# Patient Record
Sex: MALE | Race: WHITE | Employment: OTHER | URBAN - METROPOLITAN AREA
[De-identification: names, ages, dates, MRNs, and addresses within clinical notes are randomized per-mention and may not be internally consistent; named-entity substitution may affect disease eponyms.]

---

## 2017-02-03 RX ORDER — SIMVASTATIN 40 MG
40 TABLET ORAL NIGHTLY
Qty: 90 TABLET | Refills: 3 | Status: SHIPPED | OUTPATIENT
Start: 2017-02-03

## 2017-09-15 RX ORDER — GLIMEPIRIDE 1 MG/1
TABLET ORAL
Qty: 90 TABLET | Refills: 2 | Status: SHIPPED | OUTPATIENT
Start: 2017-09-15

## 2017-11-27 ENCOUNTER — APPOINTMENT (OUTPATIENT)
Dept: GENERAL RADIOLOGY | Age: 67
DRG: 247 | End: 2017-11-27
Payer: MEDICARE

## 2017-11-27 ENCOUNTER — HOSPITAL ENCOUNTER (INPATIENT)
Age: 67
LOS: 1 days | Discharge: HOME | DRG: 247 | End: 2017-11-28
Attending: EMERGENCY MEDICINE | Admitting: INTERNAL MEDICINE
Payer: MEDICARE

## 2017-11-27 ENCOUNTER — APPOINTMENT (OUTPATIENT)
Dept: CARDIAC CATH/INVASIVE PROCEDURES | Age: 67
DRG: 247 | End: 2017-11-27
Payer: MEDICARE

## 2017-11-27 DIAGNOSIS — R07.9 ACUTE CHEST PAIN: Primary | ICD-10-CM

## 2017-11-27 LAB
ALBUMIN SERPL-MCNC: 4.4 G/DL (ref 3.9–4.9)
ALP BLD-CCNC: 72 U/L (ref 35–104)
ALT SERPL-CCNC: 58 U/L (ref 0–41)
ANION GAP SERPL CALCULATED.3IONS-SCNC: 17 MEQ/L (ref 7–13)
ANISOCYTOSIS: ABNORMAL
AST SERPL-CCNC: 31 U/L (ref 0–40)
ATYPICAL LYMPHOCYTE RELATIVE PERCENT: 21 %
BASOPHILS ABSOLUTE: 0 K/UL (ref 0–0.2)
BASOPHILS RELATIVE PERCENT: 0 %
BILIRUB SERPL-MCNC: 0.5 MG/DL (ref 0–1.2)
BUN BLDV-MCNC: 24 MG/DL (ref 8–23)
CALCIUM SERPL-MCNC: 10.1 MG/DL (ref 8.6–10.2)
CHLORIDE BLD-SCNC: 99 MEQ/L (ref 98–107)
CHP ED QC CHECK: NORMAL
CO2: 22 MEQ/L (ref 22–29)
CREAT SERPL-MCNC: 0.64 MG/DL (ref 0.7–1.2)
EOSINOPHILS ABSOLUTE: 2.3 K/UL (ref 0–0.7)
EOSINOPHILS RELATIVE PERCENT: 17 %
GFR AFRICAN AMERICAN: >60
GFR NON-AFRICAN AMERICAN: >60
GLOBULIN: 2.4 G/DL (ref 2.3–3.5)
GLUCOSE BLD-MCNC: 207 MG/DL
GLUCOSE BLD-MCNC: 207 MG/DL (ref 60–115)
GLUCOSE BLD-MCNC: 232 MG/DL (ref 74–109)
GLUCOSE BLD-MCNC: 257 MG/DL (ref 60–115)
HBA1C MFR BLD: 6.4 % (ref 4.8–5.9)
HCT VFR BLD CALC: 46 % (ref 42–52)
HEMATOLOGY PATH CONSULT: YES
HEMOGLOBIN: 15.5 G/DL (ref 14–18)
INR BLD: 1
LYMPHOCYTES ABSOLUTE: 5.7 K/UL (ref 1–4.8)
LYMPHOCYTES RELATIVE PERCENT: 21 %
MCH RBC QN AUTO: 33.1 PG (ref 27–31.3)
MCHC RBC AUTO-ENTMCNC: 33.8 % (ref 33–37)
MCV RBC AUTO: 97.8 FL (ref 80–100)
MONOCYTES ABSOLUTE: 1.8 K/UL (ref 0.2–0.8)
MONOCYTES RELATIVE PERCENT: 13 %
NEUTROPHILS ABSOLUTE: 3.8 K/UL (ref 1.4–6.5)
NEUTROPHILS RELATIVE PERCENT: 28 %
PDW BLD-RTO: 12.9 % (ref 11.5–14.5)
PERFORMED ON: ABNORMAL
PERFORMED ON: ABNORMAL
PLATELET # BLD: 258 K/UL (ref 130–400)
PLATELET SLIDE REVIEW: ADEQUATE
POTASSIUM SERPL-SCNC: 4.5 MEQ/L (ref 3.5–5.1)
PROTHROMBIN TIME: 10.8 SEC (ref 8.1–13.7)
RBC # BLD: 4.7 M/UL (ref 4.7–6.1)
SLIDE REVIEW: ABNORMAL
SODIUM BLD-SCNC: 138 MEQ/L (ref 132–144)
TOTAL PROTEIN: 6.8 G/DL (ref 6.4–8.1)
TROPONIN: 0.09 NG/ML (ref 0–0.01)
TROPONIN: <0.01 NG/ML (ref 0–0.01)
WBC # BLD: 13.5 K/UL (ref 4.8–10.8)

## 2017-11-27 PROCEDURE — C1894 INTRO/SHEATH, NON-LASER: HCPCS

## 2017-11-27 PROCEDURE — 6370000000 HC RX 637 (ALT 250 FOR IP): Performed by: EMERGENCY MEDICINE

## 2017-11-27 PROCEDURE — 6360000002 HC RX W HCPCS

## 2017-11-27 PROCEDURE — 99223 1ST HOSP IP/OBS HIGH 75: CPT | Performed by: INTERNAL MEDICINE

## 2017-11-27 PROCEDURE — 84484 ASSAY OF TROPONIN QUANT: CPT

## 2017-11-27 PROCEDURE — 96374 THER/PROPH/DIAG INJ IV PUSH: CPT

## 2017-11-27 PROCEDURE — 83036 HEMOGLOBIN GLYCOSYLATED A1C: CPT

## 2017-11-27 PROCEDURE — 6370000000 HC RX 637 (ALT 250 FOR IP): Performed by: NURSE PRACTITIONER

## 2017-11-27 PROCEDURE — G0378 HOSPITAL OBSERVATION PER HR: HCPCS

## 2017-11-27 PROCEDURE — 92928 PRQ TCAT PLMT NTRAC ST 1 LES: CPT | Performed by: INTERNAL MEDICINE

## 2017-11-27 PROCEDURE — 2720000001 HC MISC SURG SUPPLY STERILE $51-500

## 2017-11-27 PROCEDURE — 93005 ELECTROCARDIOGRAM TRACING: CPT

## 2017-11-27 PROCEDURE — 2500000003 HC RX 250 WO HCPCS

## 2017-11-27 PROCEDURE — 36415 COLL VENOUS BLD VENIPUNCTURE: CPT

## 2017-11-27 PROCEDURE — C1887 CATHETER, GUIDING: HCPCS

## 2017-11-27 PROCEDURE — 99285 EMERGENCY DEPT VISIT HI MDM: CPT

## 2017-11-27 PROCEDURE — 2580000003 HC RX 258: Performed by: EMERGENCY MEDICINE

## 2017-11-27 PROCEDURE — 93458 L HRT ARTERY/VENTRICLE ANGIO: CPT | Performed by: INTERNAL MEDICINE

## 2017-11-27 PROCEDURE — 2580000003 HC RX 258

## 2017-11-27 PROCEDURE — 85025 COMPLETE CBC W/AUTO DIFF WBC: CPT

## 2017-11-27 PROCEDURE — C1725 CATH, TRANSLUMIN NON-LASER: HCPCS

## 2017-11-27 PROCEDURE — 85610 PROTHROMBIN TIME: CPT

## 2017-11-27 PROCEDURE — 6360000002 HC RX W HCPCS: Performed by: EMERGENCY MEDICINE

## 2017-11-27 PROCEDURE — 71010 XR CHEST PORTABLE: CPT

## 2017-11-27 PROCEDURE — 85347 COAGULATION TIME ACTIVATED: CPT

## 2017-11-27 PROCEDURE — 80053 COMPREHEN METABOLIC PANEL: CPT

## 2017-11-27 PROCEDURE — C1769 GUIDE WIRE: HCPCS

## 2017-11-27 PROCEDURE — C1874 STENT, COATED/COV W/DEL SYS: HCPCS

## 2017-11-27 RX ORDER — LISINOPRIL 10 MG/1
10 TABLET ORAL DAILY
Status: DISCONTINUED | OUTPATIENT
Start: 2017-11-27 | End: 2017-11-28 | Stop reason: HOSPADM

## 2017-11-27 RX ORDER — ACETAMINOPHEN 325 MG/1
650 TABLET ORAL EVERY 4 HOURS PRN
Status: DISCONTINUED | OUTPATIENT
Start: 2017-11-27 | End: 2017-11-28 | Stop reason: HOSPADM

## 2017-11-27 RX ORDER — GEMFIBROZIL 600 MG/1
600 TABLET, FILM COATED ORAL
COMMUNITY

## 2017-11-27 RX ORDER — ASPIRIN 81 MG/1
81 TABLET ORAL DAILY
Status: DISCONTINUED | OUTPATIENT
Start: 2017-11-27 | End: 2017-11-28 | Stop reason: HOSPADM

## 2017-11-27 RX ORDER — CLOPIDOGREL BISULFATE 75 MG/1
75 TABLET ORAL DAILY
Status: DISCONTINUED | OUTPATIENT
Start: 2017-11-27 | End: 2017-11-28 | Stop reason: HOSPADM

## 2017-11-27 RX ORDER — ASPIRIN 81 MG/1
162 TABLET, CHEWABLE ORAL ONCE
Status: COMPLETED | OUTPATIENT
Start: 2017-11-27 | End: 2017-11-27

## 2017-11-27 RX ORDER — SODIUM CHLORIDE 0.9 % (FLUSH) 0.9 %
10 SYRINGE (ML) INJECTION PRN
Status: DISCONTINUED | OUTPATIENT
Start: 2017-11-27 | End: 2017-11-28 | Stop reason: HOSPADM

## 2017-11-27 RX ORDER — GEMFIBROZIL 600 MG/1
600 TABLET, FILM COATED ORAL
Status: DISCONTINUED | OUTPATIENT
Start: 2017-11-28 | End: 2017-11-28 | Stop reason: HOSPADM

## 2017-11-27 RX ORDER — DEXTROSE MONOHYDRATE 25 G/50ML
12.5 INJECTION, SOLUTION INTRAVENOUS PRN
Status: DISCONTINUED | OUTPATIENT
Start: 2017-11-27 | End: 2017-11-28 | Stop reason: HOSPADM

## 2017-11-27 RX ORDER — SODIUM CHLORIDE 0.9 % (FLUSH) 0.9 %
10 SYRINGE (ML) INJECTION EVERY 12 HOURS SCHEDULED
Status: DISCONTINUED | OUTPATIENT
Start: 2017-11-27 | End: 2017-11-28 | Stop reason: HOSPADM

## 2017-11-27 RX ORDER — SIMVASTATIN 40 MG
40 TABLET ORAL NIGHTLY
Status: DISCONTINUED | OUTPATIENT
Start: 2017-11-27 | End: 2017-11-28 | Stop reason: HOSPADM

## 2017-11-27 RX ORDER — ONDANSETRON 2 MG/ML
4 INJECTION INTRAMUSCULAR; INTRAVENOUS EVERY 6 HOURS PRN
Status: DISCONTINUED | OUTPATIENT
Start: 2017-11-27 | End: 2017-11-28 | Stop reason: HOSPADM

## 2017-11-27 RX ORDER — DEXTROSE MONOHYDRATE 50 MG/ML
100 INJECTION, SOLUTION INTRAVENOUS PRN
Status: DISCONTINUED | OUTPATIENT
Start: 2017-11-27 | End: 2017-11-28 | Stop reason: HOSPADM

## 2017-11-27 RX ORDER — NICOTINE POLACRILEX 4 MG
15 LOZENGE BUCCAL PRN
Status: DISCONTINUED | OUTPATIENT
Start: 2017-11-27 | End: 2017-11-28 | Stop reason: HOSPADM

## 2017-11-27 RX ORDER — MORPHINE SULFATE 2 MG/ML
2 INJECTION, SOLUTION INTRAMUSCULAR; INTRAVENOUS
Status: DISCONTINUED | OUTPATIENT
Start: 2017-11-27 | End: 2017-11-28 | Stop reason: HOSPADM

## 2017-11-27 RX ORDER — SODIUM CHLORIDE 9 MG/ML
INJECTION, SOLUTION INTRAVENOUS CONTINUOUS
Status: DISCONTINUED | OUTPATIENT
Start: 2017-11-27 | End: 2017-11-28

## 2017-11-27 RX ORDER — MORPHINE SULFATE 4 MG/ML
4 INJECTION, SOLUTION INTRAMUSCULAR; INTRAVENOUS
Status: DISCONTINUED | OUTPATIENT
Start: 2017-11-27 | End: 2017-11-28 | Stop reason: HOSPADM

## 2017-11-27 RX ORDER — 0.9 % SODIUM CHLORIDE 0.9 %
1000 INTRAVENOUS SOLUTION INTRAVENOUS ONCE
Status: COMPLETED | OUTPATIENT
Start: 2017-11-27 | End: 2017-11-27

## 2017-11-27 RX ORDER — LISINOPRIL 10 MG/1
10 TABLET ORAL DAILY
COMMUNITY

## 2017-11-27 RX ORDER — DILTIAZEM HYDROCHLORIDE 240 MG/1
240 CAPSULE, COATED, EXTENDED RELEASE ORAL DAILY
Status: DISCONTINUED | OUTPATIENT
Start: 2017-11-27 | End: 2017-11-28 | Stop reason: HOSPADM

## 2017-11-27 RX ORDER — NITROGLYCERIN 0.4 MG/1
0.4 TABLET SUBLINGUAL EVERY 5 MIN PRN
Status: DISCONTINUED | OUTPATIENT
Start: 2017-11-27 | End: 2017-11-28 | Stop reason: HOSPADM

## 2017-11-27 RX ORDER — GLIMEPIRIDE 1 MG/1
1 TABLET ORAL
Status: DISCONTINUED | OUTPATIENT
Start: 2017-11-28 | End: 2017-11-28 | Stop reason: HOSPADM

## 2017-11-27 RX ORDER — KETOROLAC TROMETHAMINE 30 MG/ML
30 INJECTION, SOLUTION INTRAMUSCULAR; INTRAVENOUS ONCE
Status: COMPLETED | OUTPATIENT
Start: 2017-11-27 | End: 2017-11-27

## 2017-11-27 RX ADMIN — ASPIRIN 81 MG 162 MG: 81 TABLET ORAL at 12:57

## 2017-11-27 RX ADMIN — NITROGLYCERIN 0.4 MG: 0.4 TABLET SUBLINGUAL at 12:57

## 2017-11-27 RX ADMIN — INSULIN LISPRO 3 UNITS: 100 INJECTION, SOLUTION INTRAVENOUS; SUBCUTANEOUS at 22:46

## 2017-11-27 RX ADMIN — KETOROLAC TROMETHAMINE 30 MG: 30 INJECTION, SOLUTION INTRAMUSCULAR at 13:56

## 2017-11-27 RX ADMIN — SODIUM CHLORIDE 1000 ML: 9 INJECTION, SOLUTION INTRAVENOUS at 13:12

## 2017-11-27 ASSESSMENT — ENCOUNTER SYMPTOMS
ALLERGIC/IMMUNOLOGIC NEGATIVE: 1
EYE PAIN: 0
NAUSEA: 1
EYES NEGATIVE: 1
SORE THROAT: 0
ABDOMINAL PAIN: 0
VOMITING: 0
STRIDOR: 0
CHEST TIGHTNESS: 0
CHOKING: 0
APNEA: 0
NAUSEA: 0
WHEEZING: 0
COUGH: 0
SHORTNESS OF BREATH: 1

## 2017-11-27 ASSESSMENT — PAIN DESCRIPTION - LOCATION
LOCATION: CHEST

## 2017-11-27 ASSESSMENT — PAIN SCALES - GENERAL
PAINLEVEL_OUTOF10: 0
PAINLEVEL_OUTOF10: 8
PAINLEVEL_OUTOF10: 8
PAINLEVEL_OUTOF10: 7
PAINLEVEL_OUTOF10: 4

## 2017-11-27 ASSESSMENT — PAIN DESCRIPTION - FREQUENCY
FREQUENCY: CONTINUOUS
FREQUENCY: INTERMITTENT

## 2017-11-27 ASSESSMENT — PAIN DESCRIPTION - PAIN TYPE
TYPE: ACUTE PAIN

## 2017-11-27 ASSESSMENT — PAIN DESCRIPTION - DESCRIPTORS
DESCRIPTORS: DULL;SHARP
DESCRIPTORS: DULL;SHARP

## 2017-11-27 ASSESSMENT — PAIN DESCRIPTION - ORIENTATION: ORIENTATION: MID

## 2017-11-27 ASSESSMENT — PAIN DESCRIPTION - ONSET: ONSET: PROGRESSIVE

## 2017-11-27 NOTE — ED PROVIDER NOTES
[address] [facility] ED  eMERGENCY dEPARTMENT eNCOUnter      Pt Name: Jose Mono  MRN: [id number]  [place] 1950  Date of evaluation: 11/27/2017  Provider: Hi Pierson DO    CHIEF COMPLAINT       Chief Complaint   Patient presents with    Chest Pain     pt c/o chest pain, nausea, vomiting and diarrhea for the past couple hours. HISTORY OF PRESENT ILLNESS   (Location/Symptom, Timing/Onset, Context/Setting, Quality, Duration, Modifying Factors, Severity)  Note limiting factors. Jose Moon is a 79 y.o. male who presents to the emergency department . Patient presented with crushing chest pain which woke him up from a nap. Patient states this pain feels like the same pain he has had with his myocardial infarctions. Had the pain for a couple of hours prior to coming into the ER. He feels mildly short of breath. He did take 2 nitroglycerin without any relief. Patient is on Plavix but did not take it today. He did take 2 baby aspirin while at home. HPI    Nursing Notes were reviewed. REVIEW OF SYSTEMS    (2-9 systems for level 4, 10 or more for level 5)     Review of Systems   Constitutional: Positive for diaphoresis. Negative for activity change, appetite change and fatigue. HENT: Negative for congestion and sore throat. Eyes: Negative for pain and visual disturbance. Respiratory: Positive for shortness of breath. Negative for chest tightness. Cardiovascular: Positive for chest pain. Gastrointestinal: Negative for abdominal pain, nausea and vomiting. Endocrine: Negative for polydipsia. Genitourinary: Negative for flank pain and urgency. Musculoskeletal: Negative for gait problem and neck stiffness. Skin: Negative for rash. Neurological: Negative for weakness, light-headedness and headaches. Psychiatric/Behavioral: Negative for confusion and sleep disturbance. Except as noted above the remainder of the review of systems was reviewed and negative.        PAST MEDICAL HISTORY     Past Medical History:   Diagnosis Date    Acute MI [facility]) 5706    Alcoholic [facility])     previous very heavy drinker    CAD (coronary artery disease)     Chest [facility] 2012    [facility]    Colon polyps     adenomatous    Diabetes mellitus, type II ([address]     History of alcoholism 11/30/2015    History of tobacco abuse 11/30/2015    Hyperlipidemia     Hypertension     S/P coronary artery stent placement 11/30/2015    TOTAL OF 8 STENTS 2007 - 5 stents placed 2/16/15 XIENCE (LAD), PROMUS (LAD) - 2 stents placed 4/23/15PROMUS (LAD)    Unspecified sleep apnea          SURGICAL HISTORY       Past Surgical History:   Procedure Laterality Date    COLONOSCOPY  2011    Diverticulosis    CORONARY ANGIOPLASTY      CORONARY ANGIOPLASTY WITH STENT PLACEMENT  9-2011    CORONARY ARTERY BYPASS GRAFT      EYE SURGERY      Bilaterl cataracts    MANDIBLE SURGERY      Surgery for sleep apnea    ROTATOR CUFF REPAIR  2009    Rt. shoulder-Dr. Darwin Mari UPPER GASTROINTESTINAL ENDOSCOPY  2011    Hiatal hernia         CURRENT MEDICATIONS       Previous Medications    ASPIRIN EC 81 MG EC TABLET    Take 81 mg by mouth daily. BENZONATATE (TESSALON PERLES) 100 MG CAPSULE    Take 1 capsule by mouth every 6 hours as needed for Cough. CLOPIDOGREL (PLAVIX) 75 MG TABLET    Take 1 tablet by mouth daily. DILTIAZEM (CARDIZEM CD) 240 MG ER CAPSULE    Take 240 mg by mouth daily. GEMFIBROZIL (LOPID) 600 MG TABLET    Take 600 mg by mouth 2 times daily (before meals)    GLIMEPIRIDE (AMARYL) 1 MG TABLET    TAKE ONE TABLET BY MOUTH IN THE MORNING before breakfast    LISINOPRIL (PRINIVIL;ZESTRIL) 10 MG TABLET    Take 10 mg by mouth daily    MELOXICAM (MOBIC) 15 MG TABLET    Take 1 tablet by mouth daily. METFORMIN (GLUCOPHAGE) 1000 MG TABLET    Take 1 tablet by mouth 2 times daily (with meals)    NITROGLYCERIN (NITROSTAT) 0.4 MG SL TABLET    Place 1 tablet under the tongue every 5 minutes as needed. SIMVASTATIN (ZOCOR) 40 MG TABLET    Take 1 tablet by mouth nightly       ALLERGIES     Review of patient's allergies indicates no known allergies. FAMILY HISTORY       Family History   Problem Relation Age of Onset    Arthritis Mother     Depression Mother     High Cholesterol Mother     Arthritis Father     Depression Father     Hearing Loss Father     High Blood Pressure Father     High Cholesterol Father     Other Father      COPD    Arthritis Sister     Hearing Loss Sister     High Blood Pressure Sister     High Cholesterol Sister     Other Sister      COPD    Arthritis Brother     Depression Brother     Hearing Loss Brother     High Blood Pressure Brother     High Cholesterol Brother     Other Brother      COPD          SOCIAL HISTORY       Social History     Social History    Marital status:      Spouse name: N/A    Number of children: N/A    Years of education: N/A     Social History Main Topics    Smoking status: Former Smoker     Packs/day: 1.00     Types: Cigarettes    Smokeless tobacco: Never Used    Alcohol use No      Comment: Sober for many years    Drug use: Unknown    Sexual activity: Not on file     Other Topics Concern    Not on file     Social History Narrative    No narrative on file       SCREENINGS             PHYSICAL EXAM    (up to 7 for level 4, 8 or more for level 5)     ED Triage Vitals   BP Temp Temp Source Pulse Resp SpO2 Height Weight   11/27/17 1236 11/27/17 1236 11/27/17 1236 11/27/17 1236 11/27/17 1236 11/27/17 1236 11/27/17 1328 11/27/17 1236   131/80 97.9 °F (36.6 °C) Oral 85 17 94 % 5' 5\" (1.651 m) 185 lb (83.9 kg)       Physical Exam   Constitutional: He is oriented to person, place, and time. He appears well-developed and well-nourished. He appears distressed. HENT:   Head: Normocephalic and atraumatic.    Right Ear: External ear normal.   Left Ear: External ear normal.   Mouth/Throat: Oropharynx is clear and moist. No oropharyngeal exudate. Eyes: Conjunctivae are normal. Pupils are equal, round, and reactive to light. Neck: Normal range of motion. Neck supple. No JVD present. No tracheal deviation present. No thyromegaly present. Cardiovascular: Normal rate and normal heart sounds. No murmur heard. Pulmonary/Chest: Effort normal and breath sounds normal. No respiratory distress. He has no wheezes. He has no rales. Abdominal: Soft. Bowel sounds are normal. He exhibits no distension. There is no tenderness. There is no guarding. Musculoskeletal: Normal range of motion. He exhibits no edema. Neurological: He is alert and oriented to person, place, and time. No cranial nerve deficit. Skin: Skin is warm. No rash noted. He is diaphoretic. There is pallor. Psychiatric: He has a normal mood and affect. His behavior is normal.       DIAGNOSTIC RESULTS     EKG: All EKG's are interpreted by the Emergency Department Physician who either signs or Co-signs this chart in the absence of a cardiologist.    Normal sinus rhythm. 76 bpm.  Q waves inferiorly. No acute ischemia    RADIOLOGY:   Non-plain film images such as CT, Ultrasound and MRI are read by the radiologist. Plain radiographic images are visualized and preliminarily interpreted by the emergency physician with the below findings:    Chest x-ray as below    Interpretation per the Radiologist below, if available at the time of this note:    XR Chest Portable   Final Result   1. No acute pulmonary parenchymal abnormality. 2. Sclerotic changes of the left femoral head, the appearances suggestive of osteonecrosis however incompletely evaluated on this exam. If this has not been previously evaluated recommend dedicated shoulder radiograph.                ED BEDSIDE ULTRASOUND:   Performed by ED Physician - none    LABS:  Labs Reviewed   CBC WITH AUTO DIFFERENTIAL - Abnormal; Notable for the following:        Result Value    WBC 13.5 (*)     MCH 33.1 (*)     Lymphocytes # 5.7 (*) Monocytes # 1.8 (*)     Eosinophils # 2.3 (*)     All other components within normal limits   COMPREHENSIVE METABOLIC PANEL - Abnormal; Notable for the following: Anion Gap 17 (*)     Glucose 232 (*)     BUN 24 (*)     CREATININE 0.64 (*)     ALT 58 (*)     All other components within normal limits   POCT GLUCOSE - Abnormal; Notable for the following:     POC Glucose 207 (*)     All other components within normal limits   POCT GLUCOSE - Normal   TROPONIN   PROTIME-INR       All other labs were within normal range or not returned as of this dictation. EMERGENCY DEPARTMENT COURSE and DIFFERENTIAL DIAGNOSIS/MDM:   Vitals:    Vitals:    11/27/17 1236 11/27/17 1309 11/27/17 1328 11/27/17 1431   BP: 131/80 (!) 82/40 126/71 114/61   Pulse: 85 63 70 75   Resp: 17 18 14 17   Temp: 97.9 °F (36.6 °C)      TempSrc: Oral      SpO2: 94% 97% 96% 99%   Weight: 185 lb (83.9 kg)      Height:   5' 5\" (1.651 m)        Patient presented with crushing chest pain. The pain was similar to when he had myocardial infarction. She was first EKG did not show any obvious ischemia and his first troponin was negative. Nitroglycerin did not help his pain. Dr. Brenda Lane will take the patient to the cath lab. MDM      REASSESSMENT     ED Course          CRITICAL CARE TIME   Total Critical Care time was 30 minutes, excluding separately reportable procedures. There was a high probability of clinically significant/life threatening deterioration in the patient's condition which required my urgent intervention. CONSULTS:  IP CONSULT TO CARDIAC REHAB    PROCEDURES:  Unless otherwise noted below, none     Procedures    FINAL IMPRESSION      1.  Acute chest pain          DISPOSITION/PLAN   DISPOSITION Admitted    PATIENT REFERRED TO:  Christie Salas MD            DISCHARGE MEDICATIONS:  New Prescriptions    No medications on file          (Please note that portions of this note were completed with a voice recognition program.  Efforts

## 2017-11-27 NOTE — PROGRESS NOTES
Arrived to pre/post cath from cath lab and report received from [facility]. 6 Bengali sheath in right groin attached to pressure bag and flushes easily. Attached to monitor  And vital signs are stable. Right groin no bleeding or hematoma. Dr. Meena Tellez reviewed results with patient and family.   Will continue to monitor

## 2017-11-27 NOTE — ED NOTES
Attempted to call report. Nurse to call back.  Unable to come to phone     Milana Aguirre RN  11/27/17 [id number]

## 2017-11-27 NOTE — H&P
History and Physical  Patient: Josue Beebe  Unit/Bed: 07/07  YOB: 1950  MRN: [id number]  Acct: [de-identified]   Admitting Diagnosis: Chest pain [R07.9]  Admit Date:  11/27/2017  Hospital Day: 0      Chief Complaint:   Chest pain    History of Present Illness:   42-year-old male was seen in Dr. Myesha Sierra in the past presented complaining of chest pain states it was an 8 out of 10 pain scale while he was just laying down. He states that when the pain came on he felt extremely nauseated, diaphoretic, short of breath and didn't feel like himself. He scattered well-documented history his last heart catheterization was in 2015 he had patent stents as well as LAD and right coronary artery. He states that when this pain came on it was an 8 out of 10 on pain scale took 2 nitro and got minimal relief he called 911 the eminence brought him in. He states that back in August he did talk with his cardiologist and they talked about doing another heart catheterization but he wanted to put it off but now this pain came on and it was very similar to the last 2 times when he had stents with the pain in his shortness breath and diaphoresis. So positive for chest pain, shortness breath, diaphoresis, nausea.   No fever, chills, PND, orthopnea, claudications  PMHx:  Past Medical History:   Diagnosis Date    Acute MI ([facility] [address] 1078    Alcoholic ([facility] [address]     previous very heavy drinker    CAD (coronary artery disease)     Chest pain 2012    [facility] without MI    Colon polyps     adenomatous    Diabetes mellitus, type II ([facility] [address]     History of alcoholism 11/30/2015    History of tobacco abuse 11/30/2015    Hyperlipidemia     Hypertension     S/P coronary artery stent placement 11/30/2015    TOTAL OF 8 STENTS 2007 - 5 stents placed 2/16/15 XIENCE (LAD), PROMUS (LAD) - 2 stents placed 4/23/15PROMUS (LAD)    Unspecified sleep apnea        PSHx:  Past Surgical History:   Procedure Laterality Date    COLONOSCOPY  2011 Diverticulosis    CORONARY ANGIOPLASTY      CORONARY ANGIOPLASTY WITH STENT PLACEMENT  9-2011    CORONARY ARTERY BYPASS GRAFT      EYE SURGERY      Bilaterl cataracts    MANDIBLE SURGERY      Surgery for sleep apnea    ROTATOR CUFF REPAIR  2009    Rt. shoulder-Dr. Jacques Room    UPPER GASTROINTESTINAL ENDOSCOPY  2011    Hiatal hernia       Social Hx:  Social History     Social History    Marital status:      Spouse name: N/A    Number of children: N/A    Years of education: N/A     Social History Main Topics    Smoking status: Former Smoker     Packs/day: 1.00     Types: Cigarettes    Smokeless tobacco: Never Used    Alcohol use No      Comment: Sober for many years    Drug use: Unknown    Sexual activity: Not on file     Other Topics Concern    Not on file     Social History Narrative    No narrative on file       Family Hx:  Family History   Problem Relation Age of Onset    Arthritis Mother     Depression Mother     High Cholesterol Mother     Arthritis Father     Depression Father     Hearing Loss Father     High Blood Pressure Father     High Cholesterol Father     Other Father      COPD    Arthritis Sister     Hearing Loss Sister     High Blood Pressure Sister     High Cholesterol Sister     Other Sister      COPD    Arthritis Brother     Depression Brother     Hearing Loss Brother     High Blood Pressure Brother     High Cholesterol Brother     Other Brother      COPD       Review of Systems:   Review of Systems   Constitutional: Positive for diaphoresis. Negative for appetite change, chills, fatigue and fever. HENT: Negative. Eyes: Negative. Respiratory: Positive for shortness of breath. Negative for apnea, cough, choking, chest tightness, wheezing and stridor. Cardiovascular: Positive for chest pain. Negative for palpitations and leg swelling. Gastrointestinal: Positive for nausea. Endocrine: Negative. Genitourinary: Negative.     Musculoskeletal: Negative. Allergic/Immunologic: Negative. Neurological: Negative. Hematological: Negative. Psychiatric/Behavioral: Negative. Physical Examination:    /61   Pulse 75   Temp 97.9 °F (36.6 °C) (Oral)   Resp 17   Ht 5' 5\" (1.651 m)   Wt 185 lb (83.9 kg)   SpO2 99%   BMI 30.79 kg/m²    Physical Exam   Constitutional: He is oriented to person, place, and time. He appears well-developed and well-nourished. HENT:   Head: Normocephalic. Eyes: Pupils are equal, round, and reactive to light. Neck: No JVD present. No tracheal deviation present. No thyromegaly present. Cardiovascular: Normal rate, regular rhythm, normal heart sounds and intact distal pulses. Exam reveals no gallop and no friction rub. No murmur heard. Pulmonary/Chest: Effort normal and breath sounds normal. No respiratory distress. He has no wheezes. He has no rales. He exhibits no tenderness. Abdominal: Soft. Bowel sounds are normal.   Musculoskeletal: Normal range of motion. He exhibits no edema or tenderness. Lymphadenopathy:     He has no cervical adenopathy. Neurological: He is alert and oriented to person, place, and time. Skin: Skin is warm and dry. Psychiatric: He has a normal mood and affect.         LABS:  CBC:   Lab Results   Component Value Date    WBC 13.5 11/27/2017    RBC 4.70 11/27/2017    RBC 4.34 03/29/2012    HGB 15.5 11/27/2017    HCT 46.0 11/27/2017    MCV 97.8 11/27/2017    MCH 33.1 11/27/2017    MCHC 33.8 11/27/2017    RDW 12.9 11/27/2017     11/27/2017    MPV 8.1 04/24/2015     CBC with Differential:    Lab Results   Component Value Date    WBC 13.5 11/27/2017    RBC 4.70 11/27/2017    RBC 4.34 03/29/2012    HGB 15.5 11/27/2017    HCT 46.0 11/27/2017     11/27/2017    MCV 97.8 11/27/2017    MCH 33.1 11/27/2017    MCHC 33.8 11/27/2017    RDW 12.9 11/27/2017    LYMPHOPCT 21.0 11/27/2017    MONOPCT 13.0 11/27/2017    EOSPCT 6.8 03/28/2012    BASOPCT 0.0 11/27/2017

## 2017-11-28 VITALS
SYSTOLIC BLOOD PRESSURE: 130 MMHG | OXYGEN SATURATION: 93 % | WEIGHT: 185 LBS | BODY MASS INDEX: 30.82 KG/M2 | RESPIRATION RATE: 18 BRPM | DIASTOLIC BLOOD PRESSURE: 64 MMHG | TEMPERATURE: 97.3 F | HEART RATE: 85 BPM | HEIGHT: 65 IN

## 2017-11-28 LAB
ANION GAP SERPL CALCULATED.3IONS-SCNC: 14 MEQ/L (ref 7–13)
BUN BLDV-MCNC: 18 MG/DL (ref 8–23)
CALCIUM SERPL-MCNC: 9.4 MG/DL (ref 8.6–10.2)
CHLORIDE BLD-SCNC: 100 MEQ/L (ref 98–107)
CHOLESTEROL, TOTAL: 134 MG/DL (ref 0–199)
CO2: 25 MEQ/L (ref 22–29)
CREAT SERPL-MCNC: 0.57 MG/DL (ref 0.7–1.2)
EKG ATRIAL RATE: 76 BPM
EKG ATRIAL RATE: 83 BPM
EKG P AXIS: 21 DEGREES
EKG P AXIS: 44 DEGREES
EKG P-R INTERVAL: 178 MS
EKG P-R INTERVAL: 184 MS
EKG Q-T INTERVAL: 374 MS
EKG Q-T INTERVAL: 396 MS
EKG QRS DURATION: 84 MS
EKG QRS DURATION: 86 MS
EKG QTC CALCULATION (BAZETT): 439 MS
EKG QTC CALCULATION (BAZETT): 445 MS
EKG R AXIS: -36 DEGREES
EKG R AXIS: 22 DEGREES
EKG T AXIS: 20 DEGREES
EKG T AXIS: 70 DEGREES
EKG VENTRICULAR RATE: 76 BPM
EKG VENTRICULAR RATE: 83 BPM
GFR AFRICAN AMERICAN: >60
GFR NON-AFRICAN AMERICAN: >60
GLUCOSE BLD-MCNC: 123 MG/DL (ref 74–109)
GLUCOSE BLD-MCNC: 131 MG/DL (ref 60–115)
GLUCOSE BLD-MCNC: 309 MG/DL (ref 60–115)
HCT VFR BLD CALC: 42.3 % (ref 42–52)
HDLC SERPL-MCNC: 22 MG/DL (ref 40–59)
HEMATOLOGY PATH CONSULT: NORMAL
HEMOGLOBIN: 14.2 G/DL (ref 14–18)
INR BLD: 1.1
LDL CHOLESTEROL CALCULATED: 64 MG/DL (ref 0–129)
LV EF: 60 %
LVEF MODALITY: NORMAL
MAGNESIUM: 1.7 MG/DL (ref 1.7–2.3)
MCH RBC QN AUTO: 32.9 PG (ref 27–31.3)
MCHC RBC AUTO-ENTMCNC: 33.6 % (ref 33–37)
MCV RBC AUTO: 98 FL (ref 80–100)
PDW BLD-RTO: 13.2 % (ref 11.5–14.5)
PERFORMED ON: ABNORMAL
PERFORMED ON: ABNORMAL
PLATELET # BLD: 194 K/UL (ref 130–400)
POTASSIUM SERPL-SCNC: 4.2 MEQ/L (ref 3.5–5.1)
PROTHROMBIN TIME: 11.2 SEC (ref 8.1–13.7)
RBC # BLD: 4.31 M/UL (ref 4.7–6.1)
SODIUM BLD-SCNC: 139 MEQ/L (ref 132–144)
TRIGL SERPL-MCNC: 242 MG/DL (ref 0–200)
TROPONIN: 0.12 NG/ML (ref 0–0.01)
WBC # BLD: 7 K/UL (ref 4.8–10.8)

## 2017-11-28 PROCEDURE — 84484 ASSAY OF TROPONIN QUANT: CPT

## 2017-11-28 PROCEDURE — 93005 ELECTROCARDIOGRAM TRACING: CPT

## 2017-11-28 PROCEDURE — 85610 PROTHROMBIN TIME: CPT

## 2017-11-28 PROCEDURE — B2111ZZ FLUOROSCOPY OF MULTIPLE CORONARY ARTERIES USING LOW OSMOLAR CONTRAST: ICD-10-PCS | Performed by: INTERNAL MEDICINE

## 2017-11-28 PROCEDURE — B2151ZZ FLUOROSCOPY OF LEFT HEART USING LOW OSMOLAR CONTRAST: ICD-10-PCS | Performed by: INTERNAL MEDICINE

## 2017-11-28 PROCEDURE — 99238 HOSP IP/OBS DSCHRG MGMT 30/<: CPT | Performed by: INTERNAL MEDICINE

## 2017-11-28 PROCEDURE — 85027 COMPLETE CBC AUTOMATED: CPT

## 2017-11-28 PROCEDURE — 2060000000 HC ICU INTERMEDIATE R&B

## 2017-11-28 PROCEDURE — 6370000000 HC RX 637 (ALT 250 FOR IP): Performed by: NURSE PRACTITIONER

## 2017-11-28 PROCEDURE — 4A023N7 MEASUREMENT OF CARDIAC SAMPLING AND PRESSURE, LEFT HEART, PERCUTANEOUS APPROACH: ICD-10-PCS | Performed by: INTERNAL MEDICINE

## 2017-11-28 PROCEDURE — 36415 COLL VENOUS BLD VENIPUNCTURE: CPT

## 2017-11-28 PROCEDURE — 80048 BASIC METABOLIC PNL TOTAL CA: CPT

## 2017-11-28 PROCEDURE — 027034Z DILATION OF CORONARY ARTERY, ONE ARTERY WITH DRUG-ELUTING INTRALUMINAL DEVICE, PERCUTANEOUS APPROACH: ICD-10-PCS | Performed by: INTERNAL MEDICINE

## 2017-11-28 PROCEDURE — 80061 LIPID PANEL: CPT

## 2017-11-28 PROCEDURE — 2580000003 HC RX 258: Performed by: NURSE PRACTITIONER

## 2017-11-28 PROCEDURE — 83735 ASSAY OF MAGNESIUM: CPT

## 2017-11-28 RX ADMIN — SODIUM CHLORIDE, PRESERVATIVE FREE 10 ML: 5 INJECTION INTRAVENOUS at 07:59

## 2017-11-28 RX ADMIN — LISINOPRIL 10 MG: 10 TABLET ORAL at 07:56

## 2017-11-28 RX ADMIN — GLIMEPIRIDE 1 MG: 1 TABLET ORAL at 07:56

## 2017-11-28 RX ADMIN — INSULIN LISPRO 4 UNITS: 100 INJECTION, SOLUTION INTRAVENOUS; SUBCUTANEOUS at 12:27

## 2017-11-28 RX ADMIN — DILTIAZEM HYDROCHLORIDE 240 MG: 240 CAPSULE, COATED, EXTENDED RELEASE ORAL at 07:56

## 2017-11-28 RX ADMIN — SODIUM CHLORIDE, PRESERVATIVE FREE 10 ML: 5 INJECTION INTRAVENOUS at 08:00

## 2017-11-28 RX ADMIN — ASPIRIN 81 MG: 81 TABLET, COATED ORAL at 07:56

## 2017-11-28 RX ADMIN — CLOPIDOGREL BISULFATE 75 MG: 75 TABLET ORAL at 07:56

## 2017-11-28 NOTE — FLOWSHEET NOTE
Right groin reassessed. Site remains soft, non tender. No sign of hematoma or bruising present. Patient denies pain at site.

## 2017-11-28 NOTE — DISCHARGE SUMMARY
Cardiology Discharge Summary      Patient Identification:  Elen Sierra  : 1950  MRN: [id number]   Account: [de-identified]     Admit date: 2017  Discharge date: [unfilled]   Attending provider: Carrillo Ferrera MD        Primary care provider: Judy Crowder MD     Admission Diagnoses:  CAD (coronary artery disease)     [place]    Discharge Diagnoses: Active Hospital Problems    Diagnosis Date Noted    Chest pain [R07.9] 2017     Priority: High    Hypertension [I10] 2015     Priority: Low    Hyperlipidemia [E78.5]      Priority: Low    CAD (coronary artery disease) [I25.10]      Priority: Low    Diabetes mellitus, type II ([facility] [address] [E11.9]      Priority: Low          Hospital Course:   Elen Sierra is a 79 y.o. male admitted to [facility] on 2017 for . Ruled in    Procedures:   1. JAKE RCA     Consults:   IP CONSULT TO CARDIAC REHAB    Examination:  /64   Pulse 85   Temp 97.3 °F (36.3 °C) (Oral)   Resp 18   Ht 5' 5\" (1.651 m)   Wt 185 lb (83.9 kg)   SpO2 93%   BMI 30.79 kg/m²    Physical Exam   Constitutional: He appears healthy. No distress. HENT:   Normal cephalic and Atraumatic   Eyes: Pupils are equal, round, and reactive to light. Neck: Normal range of motion and thyroid normal. Neck supple. No JVD present. No neck adenopathy. No thyromegaly present. Cardiovascular: Normal rate, regular rhythm, normal heart sounds, intact distal pulses and normal pulses. Pulmonary/Chest: Effort normal and breath sounds normal. He has no wheezes. He has no rales. He exhibits no tenderness. Abdominal: Soft. Bowel sounds are normal. There is no tenderness. Musculoskeletal: Normal range of motion. He exhibits no edema or tenderness. Neurological: He is alert and oriented to person, place, and time. Skin: Skin is warm. No cyanosis. Nails show no clubbing.    Right groin negative    Medications:  Current

## 2017-11-28 NOTE — PROCEDURES
Brief op note    Dr. Kvng Bond performing    Indication: unstable angina    Findings: Severe mid RCA stenosis, moderate CAD elsewhere. Normal Lv, LVEDP    Conclusions: Successful PCI with JAKE X 1 mid RCA for 99% ISR. No complications. Groin sheath left in place.
artery where multiple injections were performed in different projections for cineangiography. A 5 Fr. Diagnostic 3DRC catheter was advanced and selectively engaged in the right coronary artery where multiple injections were performed in different projections for cineangiography. A pigtail catheter was placed in the left ventricle and a 20cc bolus dose of contrast given for left ventriculography. The sheath was left in place and flushed while images were reviewed for decision making. Following review of the images, the decision was made to perform PCI of the mid RCA. See separately completed report for interventional procedure details. Tha Ramesh MD [facility] ([place], [place]) Cardiology

## 2017-11-28 NOTE — PROGRESS NOTES
Patient in bed. HOB 30 degrees. Eating and drinking and tolerating. Vitals are stable. Right groin is soft, no bleeding, no hematoma. Pedal Pulse is 2+.  Will continue to monitor

## 2017-11-29 LAB
PERFORMED ON: ABNORMAL
POC ACTIVATED CLOTTING TIME KAOLIN: 423 SEC (ref 82–152)
POC SAMPLE TYPE: ABNORMAL

## 2019-06-21 ENCOUNTER — HOSPITAL ENCOUNTER (OUTPATIENT)
Dept: CARDIAC REHAB | Age: 69
Setting detail: THERAPIES SERIES
Discharge: HOME | End: 2019-06-21
Payer: MEDICARE

## 2019-06-21 PROCEDURE — 93798 PHYS/QHP OP CAR RHAB W/ECG: CPT

## 2019-06-24 ENCOUNTER — HOSPITAL ENCOUNTER (OUTPATIENT)
Dept: CARDIAC REHAB | Age: 69
Setting detail: THERAPIES SERIES
Discharge: HOME | End: 2019-06-24
Payer: MEDICARE

## 2019-06-24 PROCEDURE — 93798 PHYS/QHP OP CAR RHAB W/ECG: CPT

## 2019-06-28 ENCOUNTER — HOSPITAL ENCOUNTER (OUTPATIENT)
Dept: CARDIAC REHAB | Age: 69
Setting detail: THERAPIES SERIES
Discharge: HOME | End: 2019-06-28
Payer: MEDICARE

## 2019-06-28 PROCEDURE — 93798 PHYS/QHP OP CAR RHAB W/ECG: CPT

## 2019-07-01 ENCOUNTER — HOSPITAL ENCOUNTER (OUTPATIENT)
Dept: CARDIAC REHAB | Age: 69
Setting detail: THERAPIES SERIES
Discharge: HOME | End: 2019-07-01
Payer: MEDICARE

## 2019-07-01 PROCEDURE — 93798 PHYS/QHP OP CAR RHAB W/ECG: CPT

## 2019-07-03 ENCOUNTER — HOSPITAL ENCOUNTER (OUTPATIENT)
Dept: CARDIAC REHAB | Age: 69
Setting detail: THERAPIES SERIES
Discharge: HOME | End: 2019-07-03
Payer: MEDICARE

## 2019-07-03 PROCEDURE — 93798 PHYS/QHP OP CAR RHAB W/ECG: CPT

## 2019-07-08 ENCOUNTER — HOSPITAL ENCOUNTER (OUTPATIENT)
Dept: CARDIAC REHAB | Age: 69
Setting detail: THERAPIES SERIES
Discharge: HOME | End: 2019-07-08
Payer: MEDICARE

## 2019-07-08 PROCEDURE — 93798 PHYS/QHP OP CAR RHAB W/ECG: CPT

## 2019-07-10 ENCOUNTER — HOSPITAL ENCOUNTER (OUTPATIENT)
Dept: CARDIAC REHAB | Age: 69
Setting detail: THERAPIES SERIES
Discharge: HOME | End: 2019-07-10
Payer: MEDICARE

## 2019-07-10 PROCEDURE — 93798 PHYS/QHP OP CAR RHAB W/ECG: CPT

## 2019-07-12 ENCOUNTER — APPOINTMENT (OUTPATIENT)
Dept: CARDIAC REHAB | Age: 69
End: 2019-07-12
Payer: MEDICARE

## 2019-07-12 ENCOUNTER — HOSPITAL ENCOUNTER (OUTPATIENT)
Dept: CARDIAC REHAB | Age: 69
Setting detail: THERAPIES SERIES
Discharge: HOME | End: 2019-07-12
Payer: MEDICARE

## 2019-07-12 PROCEDURE — 93798 PHYS/QHP OP CAR RHAB W/ECG: CPT

## 2019-07-15 ENCOUNTER — APPOINTMENT (OUTPATIENT)
Dept: CARDIAC REHAB | Age: 69
End: 2019-07-15
Payer: MEDICARE

## 2019-07-15 ENCOUNTER — HOSPITAL ENCOUNTER (OUTPATIENT)
Dept: CARDIAC REHAB | Age: 69
Setting detail: THERAPIES SERIES
Discharge: HOME | End: 2019-07-15
Payer: MEDICARE

## 2019-07-15 PROCEDURE — 93798 PHYS/QHP OP CAR RHAB W/ECG: CPT

## 2019-07-17 ENCOUNTER — APPOINTMENT (OUTPATIENT)
Dept: CARDIAC REHAB | Age: 69
End: 2019-07-17
Payer: MEDICARE

## 2019-07-19 ENCOUNTER — APPOINTMENT (OUTPATIENT)
Dept: CARDIAC REHAB | Age: 69
End: 2019-07-19
Payer: MEDICARE

## 2019-07-22 ENCOUNTER — APPOINTMENT (OUTPATIENT)
Dept: CARDIAC REHAB | Age: 69
End: 2019-07-22
Payer: MEDICARE

## 2019-07-24 ENCOUNTER — APPOINTMENT (OUTPATIENT)
Dept: CARDIAC REHAB | Age: 69
End: 2019-07-24
Payer: MEDICARE

## 2019-07-26 ENCOUNTER — APPOINTMENT (OUTPATIENT)
Dept: CARDIAC REHAB | Age: 69
End: 2019-07-26
Payer: MEDICARE

## 2019-07-29 ENCOUNTER — APPOINTMENT (OUTPATIENT)
Dept: CARDIAC REHAB | Age: 69
End: 2019-07-29
Payer: MEDICARE

## 2019-07-31 ENCOUNTER — APPOINTMENT (OUTPATIENT)
Dept: CARDIAC REHAB | Age: 69
End: 2019-07-31
Payer: MEDICARE

## 2020-11-03 PROBLEM — E11.9 DIABETES MELLITUS, TYPE II (HCC): Status: RESOLVED | Noted: 2020-11-03 | Resolved: 2020-11-03

## 2023-05-30 ENCOUNTER — HOSPITAL ENCOUNTER (EMERGENCY)
Age: 73
Discharge: HOME | End: 2023-05-30
Payer: MEDICARE

## 2023-05-30 VITALS
RESPIRATION RATE: 16 BRPM | TEMPERATURE: 98 F | BODY MASS INDEX: 32.44 KG/M2 | OXYGEN SATURATION: 96 % | HEIGHT: 64 IN | HEART RATE: 84 BPM | WEIGHT: 190 LBS | DIASTOLIC BLOOD PRESSURE: 99 MMHG | SYSTOLIC BLOOD PRESSURE: 130 MMHG

## 2023-05-30 DIAGNOSIS — Z51.89 VISIT FOR WOUND CHECK: Primary | ICD-10-CM

## 2023-05-30 PROCEDURE — 99282 EMERGENCY DEPT VISIT SF MDM: CPT

## 2023-05-30 RX ORDER — RIVAROXABAN 10 MG/1
10 TABLET, FILM COATED ORAL
COMMUNITY

## 2023-05-30 ASSESSMENT — ENCOUNTER SYMPTOMS
VOMITING: 0
BACK PAIN: 0
DIARRHEA: 0
COUGH: 0
NAUSEA: 0
SHORTNESS OF BREATH: 0
ABDOMINAL PAIN: 0
SORE THROAT: 0

## 2023-05-30 ASSESSMENT — PAIN DESCRIPTION - LOCATION: LOCATION: ARM

## 2023-05-30 ASSESSMENT — PAIN SCALES - GENERAL: PAINLEVEL_OUTOF10: 4

## 2023-05-30 ASSESSMENT — LIFESTYLE VARIABLES
HOW OFTEN DO YOU HAVE A DRINK CONTAINING ALCOHOL: NEVER
HOW MANY STANDARD DRINKS CONTAINING ALCOHOL DO YOU HAVE ON A TYPICAL DAY: PATIENT DOES NOT DRINK

## 2023-05-30 ASSESSMENT — PAIN DESCRIPTION - PAIN TYPE: TYPE: ACUTE PAIN

## 2023-05-30 ASSESSMENT — PAIN DESCRIPTION - FREQUENCY: FREQUENCY: CONTINUOUS

## 2023-05-30 ASSESSMENT — PAIN DESCRIPTION - ORIENTATION: ORIENTATION: LEFT

## 2023-05-30 NOTE — ED NOTES
Gel foam and pressure dressing applied to left forearm. Bleeding well controlled and pt instructed to keep dressing on x 48 hours.      Flora Arce RN  05/30/23 [id number]

## 2023-05-30 NOTE — ED PROVIDER NOTES
[facility] ED  eMERGENCYdEPARTMENT eNCOUnter      Pt Name: Adriane Geronimo  MRN: [id number]  [place] 1950of evaluation: 5/30/2023  Provider:KOMAL Perdomo - CNP    CHIEF COMPLAINT       Chief Complaint   Patient presents with    Bleeding/Bruising     Lac on arm from Saturday, has had stitches twice, is still bleeding          HISTORY OF PRESENT ILLNESS  (Location/Symptom, Timing/Onset, Context/Setting, Quality, Duration, Modifying Factors, Severity.)   Adriane Geronimo is a 67 y.o. male hx of CAD, HTN, ETOH,  who presents to the emergency department for wound check. Patient states he had 4 sutures placed to 2 cm laceration on left forearm that occurred while he was cutting something with a knife and accidentally cut his forearm on Saturday. He went to urgent care in [facility] they placed 3 sutures then, he noticed some oozing at the site so he went back yesterday they placed another additional suture so he is a total of 4 sutures. Patient does not have a dressing in place. He is taking Xarelto. He states his vaccinations are up to date. He denies any complaints of pain. Denies any CP, SOB, Fever, chills, abdominal pain, nausea, vomiting, diarrhea, or recent illness. HPI    Nursing Notes were reviewed and I agree. REVIEW OF SYSTEMS    (2-9 systems for level 4, 10 or more for level 5)     Review of Systems   Constitutional:  Negative for activity change, chills and fever. HENT:  Negative for ear pain and sore throat. Eyes:  Negative for visual disturbance. Respiratory:  Negative for cough and shortness of breath. Cardiovascular:  Negative for chest pain, palpitations and leg swelling. Gastrointestinal:  Negative for abdominal pain, diarrhea, nausea and vomiting. Genitourinary:  Negative for dysuria. Musculoskeletal:  Negative for back pain. Skin:  Positive for wound (sutures to left forearm). Negative for rash. Neurological:  Negative for dizziness and weakness.       as noted above the

## 2023-05-30 NOTE — DISCHARGE INSTRUCTIONS
Please keep dressing intact x 48 hours with foam in place. Follow up with PCP for re check and suture removal Friday. Return to ED for new or worsening symptoms.

## 2024-01-01 NOTE — CONSULTS
Cardiac Rehab info given, benefits discussed. Patient unwilling to schedule appt. Encouraged to consider and call to schedule. immune

## 2024-08-25 ENCOUNTER — APPOINTMENT (OUTPATIENT)
Dept: CT IMAGING | Age: 74
DRG: 314 | End: 2024-08-25
Payer: MEDICARE

## 2024-08-25 ENCOUNTER — HOSPITAL ENCOUNTER (INPATIENT)
Age: 74
LOS: 2 days | Discharge: ANOTHER HOSPITAL | DRG: 314 | End: 2024-08-27
Attending: STUDENT IN AN ORGANIZED HEALTH CARE EDUCATION/TRAINING PROGRAM | Admitting: STUDENT IN AN ORGANIZED HEALTH CARE EDUCATION/TRAINING PROGRAM
Payer: MEDICARE

## 2024-08-25 ENCOUNTER — APPOINTMENT (OUTPATIENT)
Dept: GENERAL RADIOLOGY | Age: 74
DRG: 314 | End: 2024-08-25
Payer: MEDICARE

## 2024-08-25 DIAGNOSIS — I25.10 CORONARY ARTERIOSCLEROSIS: ICD-10-CM

## 2024-08-25 DIAGNOSIS — I95.9 HYPOTENSION, UNSPECIFIED HYPOTENSION TYPE: ICD-10-CM

## 2024-08-25 DIAGNOSIS — R55 SYNCOPE AND COLLAPSE: Primary | ICD-10-CM

## 2024-08-25 DIAGNOSIS — N17.9 AKI (ACUTE KIDNEY INJURY) (HCC): ICD-10-CM

## 2024-08-25 LAB
ALBUMIN SERPL-MCNC: 3.6 G/DL (ref 3.5–4.6)
ALP SERPL-CCNC: 77 U/L (ref 35–104)
ALT SERPL-CCNC: 10 U/L (ref 0–41)
ANION GAP SERPL CALCULATED.3IONS-SCNC: 11 MEQ/L (ref 9–15)
AST SERPL-CCNC: 14 U/L (ref 0–40)
BASOPHILS # BLD: 0.1 K/UL (ref 0–0.2)
BASOPHILS NFR BLD: 0.9 %
BILIRUB SERPL-MCNC: 0.3 MG/DL (ref 0.2–0.7)
BILIRUB UR QL STRIP: NEGATIVE
BUN SERPL-MCNC: 38 MG/DL (ref 8–23)
CALCIUM SERPL-MCNC: 8.6 MG/DL (ref 8.5–9.9)
CHLORIDE SERPL-SCNC: 100 MEQ/L (ref 95–107)
CLARITY UR: CLEAR
CO2 SERPL-SCNC: 24 MEQ/L (ref 20–31)
COLOR UR: YELLOW
CREAT SERPL-MCNC: 1.26 MG/DL (ref 0.7–1.2)
EOSINOPHIL # BLD: 1.2 K/UL (ref 0–0.7)
EOSINOPHIL NFR BLD: 10.8 %
ERYTHROCYTE [DISTWIDTH] IN BLOOD BY AUTOMATED COUNT: 14.1 % (ref 11.5–14.5)
GLOBULIN SER CALC-MCNC: 2.1 G/DL (ref 2.3–3.5)
GLUCOSE BLD-MCNC: 129 MG/DL (ref 70–99)
GLUCOSE SERPL-MCNC: 141 MG/DL (ref 70–99)
GLUCOSE UR STRIP-MCNC: 500 MG/DL
HCT VFR BLD AUTO: 35.3 % (ref 42–52)
HGB BLD-MCNC: 11.4 G/DL (ref 14–18)
HGB UR QL STRIP: NEGATIVE
KETONES UR STRIP-MCNC: NEGATIVE MG/DL
LEUKOCYTE ESTERASE UR QL STRIP: NEGATIVE
LYMPHOCYTES # BLD: 3 K/UL (ref 1–4.8)
LYMPHOCYTES NFR BLD: 27.5 %
MAGNESIUM SERPL-MCNC: 1.3 MG/DL (ref 1.7–2.4)
MCH RBC QN AUTO: 33.6 PG (ref 27–31.3)
MCHC RBC AUTO-ENTMCNC: 32.3 % (ref 33–37)
MCV RBC AUTO: 104.1 FL (ref 79–92.2)
MONOCYTES # BLD: 1.1 K/UL (ref 0.2–0.8)
MONOCYTES NFR BLD: 10.2 %
NEUTROPHILS # BLD: 5.5 K/UL (ref 1.4–6.5)
NEUTS SEG NFR BLD: 50.1 %
NITRITE UR QL STRIP: NEGATIVE
PERFORMED ON: ABNORMAL
PH UR STRIP: 5 [PH] (ref 5–9)
PLATELET # BLD AUTO: 225 K/UL (ref 130–400)
POTASSIUM SERPL-SCNC: 4.3 MEQ/L (ref 3.4–4.9)
PROT SERPL-MCNC: 5.7 G/DL (ref 6.3–8)
PROT UR STRIP-MCNC: NEGATIVE MG/DL
RBC # BLD AUTO: 3.39 M/UL (ref 4.7–6.1)
SODIUM SERPL-SCNC: 135 MEQ/L (ref 135–144)
SP GR UR STRIP: 1.01 (ref 1–1.03)
TROPONIN, HIGH SENSITIVITY: 31 NG/L (ref 0–19)
TROPONIN, HIGH SENSITIVITY: 33 NG/L (ref 0–19)
URINE REFLEX TO CULTURE: ABNORMAL
UROBILINOGEN UR STRIP-ACNC: 0.2 E.U./DL
WBC # BLD AUTO: 11 K/UL (ref 4.8–10.8)

## 2024-08-25 PROCEDURE — 71045 X-RAY EXAM CHEST 1 VIEW: CPT

## 2024-08-25 PROCEDURE — 81003 URINALYSIS AUTO W/O SCOPE: CPT

## 2024-08-25 PROCEDURE — 85025 COMPLETE CBC W/AUTO DIFF WBC: CPT

## 2024-08-25 PROCEDURE — 2580000003 HC RX 258: Performed by: STUDENT IN AN ORGANIZED HEALTH CARE EDUCATION/TRAINING PROGRAM

## 2024-08-25 PROCEDURE — 83735 ASSAY OF MAGNESIUM: CPT

## 2024-08-25 PROCEDURE — 99285 EMERGENCY DEPT VISIT HI MDM: CPT

## 2024-08-25 PROCEDURE — 70450 CT HEAD/BRAIN W/O DYE: CPT

## 2024-08-25 PROCEDURE — 93005 ELECTROCARDIOGRAM TRACING: CPT | Performed by: STUDENT IN AN ORGANIZED HEALTH CARE EDUCATION/TRAINING PROGRAM

## 2024-08-25 PROCEDURE — 6360000002 HC RX W HCPCS

## 2024-08-25 PROCEDURE — 2580000003 HC RX 258

## 2024-08-25 PROCEDURE — 80053 COMPREHEN METABOLIC PANEL: CPT

## 2024-08-25 PROCEDURE — 6370000000 HC RX 637 (ALT 250 FOR IP)

## 2024-08-25 PROCEDURE — 6360000002 HC RX W HCPCS: Performed by: STUDENT IN AN ORGANIZED HEALTH CARE EDUCATION/TRAINING PROGRAM

## 2024-08-25 PROCEDURE — 3E033XZ INTRODUCTION OF VASOPRESSOR INTO PERIPHERAL VEIN, PERCUTANEOUS APPROACH: ICD-10-PCS

## 2024-08-25 PROCEDURE — 93005 ELECTROCARDIOGRAM TRACING: CPT

## 2024-08-25 PROCEDURE — 84484 ASSAY OF TROPONIN QUANT: CPT

## 2024-08-25 PROCEDURE — 6370000000 HC RX 637 (ALT 250 FOR IP): Performed by: STUDENT IN AN ORGANIZED HEALTH CARE EDUCATION/TRAINING PROGRAM

## 2024-08-25 PROCEDURE — 96361 HYDRATE IV INFUSION ADD-ON: CPT

## 2024-08-25 PROCEDURE — 96365 THER/PROPH/DIAG IV INF INIT: CPT

## 2024-08-25 PROCEDURE — 2000000000 HC ICU R&B

## 2024-08-25 PROCEDURE — 2500000003 HC RX 250 WO HCPCS

## 2024-08-25 RX ORDER — INSULIN LISPRO 100 [IU]/ML
0-8 INJECTION, SOLUTION INTRAVENOUS; SUBCUTANEOUS
Status: DISCONTINUED | OUTPATIENT
Start: 2024-08-26 | End: 2024-08-27 | Stop reason: HOSPADM

## 2024-08-25 RX ORDER — ACETAMINOPHEN 325 MG/1
650 TABLET ORAL EVERY 6 HOURS PRN
Status: DISCONTINUED | OUTPATIENT
Start: 2024-08-25 | End: 2024-08-27 | Stop reason: HOSPADM

## 2024-08-25 RX ORDER — MAGNESIUM SULFATE IN WATER 40 MG/ML
2000 INJECTION, SOLUTION INTRAVENOUS ONCE
Status: COMPLETED | OUTPATIENT
Start: 2024-08-25 | End: 2024-08-25

## 2024-08-25 RX ORDER — 0.9 % SODIUM CHLORIDE 0.9 %
1000 INTRAVENOUS SOLUTION INTRAVENOUS ONCE
Status: COMPLETED | OUTPATIENT
Start: 2024-08-25 | End: 2024-08-25

## 2024-08-25 RX ORDER — MAGNESIUM SULFATE IN WATER 40 MG/ML
2000 INJECTION, SOLUTION INTRAVENOUS PRN
Status: DISCONTINUED | OUTPATIENT
Start: 2024-08-25 | End: 2024-08-27 | Stop reason: HOSPADM

## 2024-08-25 RX ORDER — DEXTROSE MONOHYDRATE 100 MG/ML
INJECTION, SOLUTION INTRAVENOUS CONTINUOUS PRN
Status: DISCONTINUED | OUTPATIENT
Start: 2024-08-25 | End: 2024-08-27 | Stop reason: HOSPADM

## 2024-08-25 RX ORDER — SODIUM CHLORIDE 0.9 % (FLUSH) 0.9 %
5-40 SYRINGE (ML) INJECTION EVERY 12 HOURS SCHEDULED
Status: DISCONTINUED | OUTPATIENT
Start: 2024-08-25 | End: 2024-08-27 | Stop reason: HOSPADM

## 2024-08-25 RX ORDER — POLYETHYLENE GLYCOL 3350 17 G/17G
17 POWDER, FOR SOLUTION ORAL DAILY PRN
Status: DISCONTINUED | OUTPATIENT
Start: 2024-08-25 | End: 2024-08-27 | Stop reason: HOSPADM

## 2024-08-25 RX ORDER — FLUDROCORTISONE ACETATE 0.1 MG/1
0.1 TABLET ORAL ONCE
Status: COMPLETED | OUTPATIENT
Start: 2024-08-25 | End: 2024-08-25

## 2024-08-25 RX ORDER — INSULIN LISPRO 100 [IU]/ML
0-4 INJECTION, SOLUTION INTRAVENOUS; SUBCUTANEOUS NIGHTLY
Status: DISCONTINUED | OUTPATIENT
Start: 2024-08-25 | End: 2024-08-27 | Stop reason: HOSPADM

## 2024-08-25 RX ORDER — ONDANSETRON 2 MG/ML
4 INJECTION INTRAMUSCULAR; INTRAVENOUS EVERY 6 HOURS PRN
Status: DISCONTINUED | OUTPATIENT
Start: 2024-08-25 | End: 2024-08-27 | Stop reason: HOSPADM

## 2024-08-25 RX ORDER — GLUCAGON 1 MG/ML
1 KIT INJECTION PRN
Status: DISCONTINUED | OUTPATIENT
Start: 2024-08-25 | End: 2024-08-26

## 2024-08-25 RX ORDER — ACETAMINOPHEN 650 MG/1
650 SUPPOSITORY RECTAL EVERY 6 HOURS PRN
Status: DISCONTINUED | OUTPATIENT
Start: 2024-08-25 | End: 2024-08-27 | Stop reason: HOSPADM

## 2024-08-25 RX ORDER — ASPIRIN 81 MG/1
81 TABLET ORAL DAILY
Status: DISCONTINUED | OUTPATIENT
Start: 2024-08-26 | End: 2024-08-27 | Stop reason: HOSPADM

## 2024-08-25 RX ORDER — POTASSIUM CHLORIDE 7.45 MG/ML
10 INJECTION INTRAVENOUS PRN
Status: DISCONTINUED | OUTPATIENT
Start: 2024-08-25 | End: 2024-08-27 | Stop reason: HOSPADM

## 2024-08-25 RX ORDER — SODIUM CHLORIDE 9 MG/ML
INJECTION, SOLUTION INTRAVENOUS PRN
Status: DISCONTINUED | OUTPATIENT
Start: 2024-08-25 | End: 2024-08-27 | Stop reason: HOSPADM

## 2024-08-25 RX ORDER — POTASSIUM CHLORIDE 1500 MG/1
40 TABLET, EXTENDED RELEASE ORAL PRN
Status: DISCONTINUED | OUTPATIENT
Start: 2024-08-25 | End: 2024-08-27 | Stop reason: HOSPADM

## 2024-08-25 RX ORDER — SODIUM CHLORIDE 0.9 % (FLUSH) 0.9 %
5-40 SYRINGE (ML) INJECTION PRN
Status: DISCONTINUED | OUTPATIENT
Start: 2024-08-25 | End: 2024-08-27 | Stop reason: HOSPADM

## 2024-08-25 RX ORDER — NOREPINEPHRINE BITARTRATE 0.06 MG/ML
1-100 INJECTION, SOLUTION INTRAVENOUS CONTINUOUS
Status: DISCONTINUED | OUTPATIENT
Start: 2024-08-25 | End: 2024-08-27

## 2024-08-25 RX ORDER — ONDANSETRON 4 MG/1
4 TABLET, ORALLY DISINTEGRATING ORAL EVERY 8 HOURS PRN
Status: DISCONTINUED | OUTPATIENT
Start: 2024-08-25 | End: 2024-08-27 | Stop reason: HOSPADM

## 2024-08-25 RX ADMIN — SODIUM CHLORIDE, PRESERVATIVE FREE 10 ML: 5 INJECTION INTRAVENOUS at 20:32

## 2024-08-25 RX ADMIN — ONDANSETRON 4 MG: 2 INJECTION INTRAMUSCULAR; INTRAVENOUS at 23:04

## 2024-08-25 RX ADMIN — FLUDROCORTISONE ACETATE 0.1 MG: 0.1 TABLET ORAL at 14:40

## 2024-08-25 RX ADMIN — RIVAROXABAN 2.5 MG: 2.5 TABLET, FILM COATED ORAL at 20:32

## 2024-08-25 RX ADMIN — MAGNESIUM SULFATE HEPTAHYDRATE 2000 MG: 40 INJECTION, SOLUTION INTRAVENOUS at 14:06

## 2024-08-25 RX ADMIN — Medication 5 MCG/MIN: at 15:30

## 2024-08-25 RX ADMIN — SODIUM CHLORIDE 1000 ML: 9 INJECTION, SOLUTION INTRAVENOUS at 12:50

## 2024-08-25 RX ADMIN — SODIUM CHLORIDE 1000 ML: 9 INJECTION, SOLUTION INTRAVENOUS at 12:26

## 2024-08-25 ASSESSMENT — PAIN SCALES - GENERAL
PAINLEVEL_OUTOF10: 0
PAINLEVEL_OUTOF10: 0
PAINLEVEL_OUTOF10: 3
PAINLEVEL_OUTOF10: 0
PAINLEVEL_OUTOF10: 0

## 2024-08-25 ASSESSMENT — PAIN DESCRIPTION - LOCATION: LOCATION: CHEST

## 2024-08-25 ASSESSMENT — PAIN DESCRIPTION - PAIN TYPE: TYPE: ACUTE PAIN

## 2024-08-25 ASSESSMENT — PAIN - FUNCTIONAL ASSESSMENT: PAIN_FUNCTIONAL_ASSESSMENT: 0-10

## 2024-08-25 ASSESSMENT — PAIN DESCRIPTION - DESCRIPTORS: DESCRIPTORS: ACHING

## 2024-08-25 NOTE — ED TRIAGE NOTES
Patient arrived to ER by [facility].  Patient was sitting at a restaurant, started feeling \"queezy\", passed out approx 1 minute.  Patient c/o slight chest pain.   A&Ox4

## 2024-08-25 NOTE — ED PROVIDER NOTES
heavy drinker    CAD (coronary artery disease)     Chest pain 2012    [facility] without MI    Colon polyps     adenomatous    Diabetes mellitus, type II (HCC)     History of alcoholism (HCC) 11/30/2015    History of tobacco abuse 11/30/2015    Hyperlipidemia     Hypertension     S/P coronary artery stent placement 11/30/2015    TOTAL OF 8 STENTS 2007 - 5 stents placed 2/16/15 XIENCE (LAD), PROMUS (LAD) - 2 stents placed 4/23/15PROMUS (LAD)    Unspecified sleep apnea          SURGICAL HISTORY       Past Surgical History:   Procedure Laterality Date    COLONOSCOPY  2011    Diverticulosis    CORONARY ANGIOPLASTY      CORONARY ANGIOPLASTY WITH STENT PLACEMENT  9-2011    CORONARY ARTERY BYPASS GRAFT      EYE SURGERY      Bilaterl cataracts    HC L HEART W LV & CORONARY  11/28/2017         MANDIBLE SURGERY      Surgery for sleep apnea    PERCUTANEOUS CORONARY INTERVENTION  11/28/2017         ROTATOR CUFF REPAIR  2009    Rt. shoulder-Dr. lockett    UPPER GASTROINTESTINAL ENDOSCOPY  2011    Hiatal hernia         CURRENT MEDICATIONS       Current Discharge Medication List        CONTINUE these medications which have NOT CHANGED    Details   Empagliflozin (JARDIANCE PO) Take by mouth      rivaroxaban (XARELTO) 10 MG TABS tablet Take 1 tablet by mouth      lisinopril (PRINIVIL;ZESTRIL) 10 MG tablet Take 1 tablet by mouth daily      metFORMIN (GLUCOPHAGE) 1000 MG tablet Take 1 tablet by mouth 2 times daily (with meals)  Qty: 60 tablet, Refills: 3      nitroGLYCERIN (NITROSTAT) 0.4 MG SL tablet Place 1 tablet under the tongue every 5 minutes as needed.  Qty: 25 tablet, Refills: 0    Associated Diagnoses: CAD (coronary artery disease)      aspirin EC 81 MG EC tablet Take 1 tablet by mouth daily      gemfibrozil (LOPID) 600 MG tablet Take 600 mg by mouth 2 times daily (before meals)      glimepiride (AMARYL) 1 MG tablet TAKE ONE TABLET BY MOUTH IN THE MORNING before breakfast  Qty: 90 tablet, Refills: 2      simvastatin (ZOCOR) 40 MG  HEAD WO CONTRAST   Final Result   No acute intracranial abnormality.         XR CHEST PORTABLE   Final Result   No acute process.               ED BEDSIDE ULTRASOUND:   Performed by ED Physician - none    LABS:  Labs Reviewed   CBC WITH AUTO DIFFERENTIAL - Abnormal; Notable for the following components:       Result Value    WBC 11.0 (*)     RBC 3.39 (*)     Hemoglobin 11.4 (*)     Hematocrit 35.3 (*)     .1 (*)     MCH 33.6 (*)     MCHC 32.3 (*)     Monocytes Absolute 1.1 (*)     Eosinophils Absolute 1.2 (*)     All other components within normal limits   COMPREHENSIVE METABOLIC PANEL - Abnormal; Notable for the following components:    Glucose 141 (*)     BUN 38 (*)     Creatinine 1.26 (*)     Est, Glom Filt Rate 59.9 (*)     Total Protein 5.7 (*)     Globulin 2.1 (*)     All other components within normal limits   MAGNESIUM - Abnormal; Notable for the following components:    Magnesium 1.3 (*)     All other components within normal limits   TROPONIN - Abnormal; Notable for the following components:    Troponin, High Sensitivity 33 (*)     All other components within normal limits   TROPONIN - Abnormal; Notable for the following components:    Troponin, High Sensitivity 31 (*)     All other components within normal limits   URINALYSIS WITH REFLEX TO CULTURE - Abnormal; Notable for the following components:    Glucose, Ur 500 (*)     All other components within normal limits       All other labs were within normal range or not returned as of this dictation.    EMERGENCY DEPARTMENT COURSE and DIFFERENTIAL DIAGNOSIS/MDM:   Vitals:    Vitals:    08/25/24 1515 08/25/24 1606 08/25/24 1607 08/25/24 1645   BP: (!) 86/36 (!) 138/47 (!) 138/47 (!) 132/45   Pulse: 65 64 64 64   Resp: 22 17 17   Temp:  96.8 °F (36 °C)     TempSrc:  Oral     SpO2: 96%   98%   Weight:       Height:         Medical Decision Making  73-year-old male presents to ED via EMS for evaluation following syncopal episode.  Patient is afebrile,  PM      PATIENT REFERRED TO:  No follow-up provider specified.    DISCHARGE MEDICATIONS:  Current Discharge Medication List        Controlled Substances Monitoring:          No data to display                (Please note that portions of this note were completed with a voice recognition program.  Efforts were made to edit the dictations but occasionally words are mis-transcribed.)    ALEK Callejas (electronically signed)    Supervising Attending Physician: Dr. Dey.     Lore Bosch, ALEK  08/25/24 [id number]

## 2024-08-25 NOTE — H&P
DEPARTMENT OF HOSPITAL MEDICINE    HISTORY AND PHYSICAL    PATIENT NAME:  Edwin Plata    MRN:  [id number]  SERVICE DATE:  8/25/2024   SERVICE TIME:  4:38 PM    Primary Care Physician: Yao Garcia MD     SUBJECTIVE  CHIEF COMPLAINT:    Chief Complaint   Patient presents with    Loss of Consciousness     Arrived by EMS     HPI:  This is a 73 y.o. male with PMH of HTN, HLD/CAD s/p stents and CABG, PAD, and T2DM who presents with an episode of syncope. Pt ans his wife who is at bedside provide the history. Pt states that he was out eating lunch when he became lightheaded, nauseous, and diaphoretic and then lost consciousness for reportedly almost a minute. EMS was called and pt was found to be bradycardic in the 40s. He was given a dose of atropine and epinephrine and his HR improved. Pt reports he is now symptom free upon arrival to the ED. He was given a total of 3L of fluid in the ED but his MAP has remained <65 and so he was started on levophed. Due to his persistent hypotension, medicine was contacted for admission. He reports being in his normal state of health prior to this event. He denies any recent illnesses. His wife states that he does struggle to drink water throughout the day. He denies fevers, chills, chest pain, shortness of breath, abdominal pain, or emesis. He does have angina that he sometimes has to take nitro for but reports this is his baseline.    PAST MEDICAL HISTORY:    Past Medical History:   Diagnosis Date    Acute MI ([facility]) 2008    Alcoholic ([facility])     previous very heavy drinker    CAD (coronary artery disease)     Chest pain 2012    [facility] without MI    Colon polyps     adenomatous    Diabetes mellitus, type II ([facility])     History of alcoholism ([facility]) 11/30/2015    History of tobacco abuse 11/30/2015    Hyperlipidemia     Hypertension     S/P coronary artery stent placement 11/30/2015    TOTAL OF 8 STENTS 2007 - 5 stents placed 2/16/15 XIENCE (LAD), PROMUS (LAD) - 2 stents placed

## 2024-08-25 NOTE — ED NOTES
Per [facility] Patient was at [facility] sitting, pt starting feeling dizzy and had syncopal episode.  Patient did not fall, was sitting up. Patient has #20 g IV Left forearm, EMS gave EPI and 1mg atropine. Patient was bradycardic at 45 heart rate and BP 70/30. After atropine heart rate improved to 60's. Patient current has 1L NS infusing by pressure bag. Patient is A&O x4.

## 2024-08-26 ENCOUNTER — APPOINTMENT (OUTPATIENT)
Dept: CT IMAGING | Age: 74
DRG: 314 | End: 2024-08-26
Payer: MEDICARE

## 2024-08-26 ENCOUNTER — APPOINTMENT (OUTPATIENT)
Age: 74
DRG: 314 | End: 2024-08-26
Attending: INTERNAL MEDICINE
Payer: MEDICARE

## 2024-08-26 ENCOUNTER — APPOINTMENT (OUTPATIENT)
Dept: ULTRASOUND IMAGING | Age: 74
DRG: 314 | End: 2024-08-26
Payer: MEDICARE

## 2024-08-26 PROBLEM — R55 SYNCOPE AND COLLAPSE: Status: ACTIVE | Noted: 2024-08-26

## 2024-08-26 LAB
ALBUMIN SERPL-MCNC: 4 G/DL (ref 3.5–4.6)
ALP SERPL-CCNC: 85 U/L (ref 35–104)
ALT SERPL-CCNC: 12 U/L (ref 0–41)
ANION GAP SERPL CALCULATED.3IONS-SCNC: 11 MEQ/L (ref 9–15)
AST SERPL-CCNC: 16 U/L (ref 0–40)
BASOPHILS # BLD: 0.1 K/UL (ref 0–0.2)
BASOPHILS NFR BLD: 0.6 %
BILIRUB SERPL-MCNC: 0.3 MG/DL (ref 0.2–0.7)
BUN SERPL-MCNC: 30 MG/DL (ref 8–23)
CALCIUM SERPL-MCNC: 9.1 MG/DL (ref 8.5–9.9)
CHLORIDE SERPL-SCNC: 107 MEQ/L (ref 95–107)
CO2 SERPL-SCNC: 23 MEQ/L (ref 20–31)
CREAT SERPL-MCNC: 0.97 MG/DL (ref 0.7–1.2)
ECHO AO ROOT DIAM: 3.5 CM
ECHO AO ROOT INDEX: 1.85 CM/M2
ECHO AR MAX VEL PISA: 2.8 M/S
ECHO AV AREA PEAK VELOCITY: 1.1 CM2
ECHO AV AREA PEAK VELOCITY: 1.1 CM2
ECHO AV AREA PEAK VELOCITY: 1.2 CM2
ECHO AV AREA PEAK VELOCITY: 1.2 CM2
ECHO AV AREA VTI: 1.1 CM2
ECHO AV AREA/BSA VTI: 0.6 CM2/M2
ECHO AV CUSP MM: 0.8 CM
ECHO AV MEAN GRADIENT: 18 MMHG
ECHO AV MEAN VELOCITY: 2 M/S
ECHO AV PEAK GRADIENT: 31 MMHG
ECHO AV PEAK GRADIENT: 32 MMHG
ECHO AV PEAK VELOCITY: 2.8 M/S
ECHO AV PEAK VELOCITY: 2.8 M/S
ECHO AV REGURGITANT PHT: 1060.4 MILLISECOND
ECHO AV VTI: 69.3 CM
ECHO BSA: 1.93 M2
ECHO EST RA PRESSURE: 3 MMHG
ECHO LA DIAMETER INDEX: 2.38 CM/M2
ECHO LA DIAMETER: 4.5 CM
ECHO LA TO AORTIC ROOT RATIO: 1.29
ECHO LA VOL A-L A2C: 68 ML (ref 18–58)
ECHO LA VOL A-L A4C: 40 ML (ref 18–58)
ECHO LA VOL MOD A2C: 66 ML (ref 18–58)
ECHO LA VOL MOD A4C: 37 ML (ref 18–58)
ECHO LA VOLUME AREA LENGTH: 52 ML
ECHO LA VOLUME INDEX A-L A2C: 36 ML/M2 (ref 16–34)
ECHO LA VOLUME INDEX A-L A4C: 21 ML/M2 (ref 16–34)
ECHO LA VOLUME INDEX AREA LENGTH: 28 ML/M2 (ref 16–34)
ECHO LA VOLUME INDEX MOD A2C: 35 ML/M2 (ref 16–34)
ECHO LA VOLUME INDEX MOD A4C: 20 ML/M2 (ref 16–34)
ECHO LV E' LATERAL VELOCITY: 9 CM/S
ECHO LV E' SEPTAL VELOCITY: 6 CM/S
ECHO LV EDV A2C: 121 ML
ECHO LV EDV A4C: 109 ML
ECHO LV EDV BP: 115 ML (ref 67–155)
ECHO LV EDV INDEX A4C: 58 ML/M2
ECHO LV EDV INDEX BP: 61 ML/M2
ECHO LV EDV NDEX A2C: 64 ML/M2
ECHO LV EJECTION FRACTION A2C: 57 %
ECHO LV EJECTION FRACTION A4C: 48 %
ECHO LV EJECTION FRACTION BIPLANE: 51 % (ref 55–100)
ECHO LV ESV A2C: 52 ML
ECHO LV ESV A4C: 56 ML
ECHO LV ESV BP: 57 ML (ref 22–58)
ECHO LV ESV INDEX A2C: 28 ML/M2
ECHO LV ESV INDEX A4C: 30 ML/M2
ECHO LV ESV INDEX BP: 30 ML/M2
ECHO LV FRACTIONAL SHORTENING: 15 % (ref 28–44)
ECHO LV INTERNAL DIMENSION DIASTOLE INDEX: 2.54 CM/M2
ECHO LV INTERNAL DIMENSION DIASTOLIC: 4.8 CM (ref 4.2–5.9)
ECHO LV INTERNAL DIMENSION SYSTOLIC INDEX: 2.17 CM/M2
ECHO LV INTERNAL DIMENSION SYSTOLIC: 4.1 CM
ECHO LV IVSD: 1.2 CM (ref 0.6–1)
ECHO LV IVSS: 1.4 CM
ECHO LV MASS 2D: 194 G (ref 88–224)
ECHO LV MASS INDEX 2D: 102.6 G/M2 (ref 49–115)
ECHO LV POSTERIOR WALL DIASTOLIC: 1 CM (ref 0.6–1)
ECHO LV POSTERIOR WALL SYSTOLIC: 1.2 CM
ECHO LV RELATIVE WALL THICKNESS RATIO: 0.42
ECHO LVOT AREA: 2.5 CM2
ECHO LVOT AV VTI INDEX: 0.42
ECHO LVOT DIAM: 1.8 CM
ECHO LVOT MEAN GRADIENT: 3 MMHG
ECHO LVOT PEAK GRADIENT: 6 MMHG
ECHO LVOT PEAK GRADIENT: 7 MMHG
ECHO LVOT PEAK VELOCITY: 1.2 M/S
ECHO LVOT PEAK VELOCITY: 1.3 M/S
ECHO LVOT STROKE VOLUME INDEX: 39.6 ML/M2
ECHO LVOT SV: 74.8 ML
ECHO LVOT VTI: 29.4 CM
ECHO MV A VELOCITY: 1.01 M/S
ECHO MV AREA PHT: 2 CM2
ECHO MV AREA VTI: 2.2 CM2
ECHO MV E DECELERATION TIME (DT): 230.1 MS
ECHO MV E VELOCITY: 1.09 M/S
ECHO MV E/A RATIO: 1.08
ECHO MV E/E' LATERAL: 12.11
ECHO MV E/E' RATIO (AVERAGED): 15.14
ECHO MV E/E' SEPTAL: 18.17
ECHO MV LVOT VTI INDEX: 1.14
ECHO MV MAX VELOCITY: 1.2 M/S
ECHO MV MEAN GRADIENT: 2 MMHG
ECHO MV MEAN VELOCITY: 0.6 M/S
ECHO MV PEAK GRADIENT: 5 MMHG
ECHO MV PRESSURE HALF TIME (PHT): 108 MS
ECHO MV VTI: 33.4 CM
ECHO PV MAX VELOCITY: 0.9 M/S
ECHO PV PEAK GRADIENT: 4 MMHG
ECHO RIGHT VENTRICULAR SYSTOLIC PRESSURE (RVSP): 30 MMHG
ECHO RV INTERNAL DIMENSION: 3.3 CM
ECHO RV TAPSE: 1.2 CM (ref 1.7–?)
ECHO RVOT PEAK GRADIENT: 2 MMHG
ECHO RVOT PEAK VELOCITY: 0.7 M/S
ECHO TV REGURGITANT MAX VELOCITY: 2.62 M/S
ECHO TV REGURGITANT PEAK GRADIENT: 27 MMHG
EKG ATRIAL RATE: 58 BPM
EKG ATRIAL RATE: 63 BPM
EKG ATRIAL RATE: 70 BPM
EKG P AXIS: 27 DEGREES
EKG P AXIS: 28 DEGREES
EKG P AXIS: 43 DEGREES
EKG P-R INTERVAL: 198 MS
EKG P-R INTERVAL: 210 MS
EKG P-R INTERVAL: 214 MS
EKG Q-T INTERVAL: 398 MS
EKG Q-T INTERVAL: 428 MS
EKG Q-T INTERVAL: 432 MS
EKG QRS DURATION: 100 MS
EKG QRS DURATION: 96 MS
EKG QRS DURATION: 96 MS
EKG QTC CALCULATION (BAZETT): 420 MS
EKG QTC CALCULATION (BAZETT): 429 MS
EKG QTC CALCULATION (BAZETT): 442 MS
EKG R AXIS: -29 DEGREES
EKG R AXIS: -36 DEGREES
EKG R AXIS: -41 DEGREES
EKG T AXIS: -25 DEGREES
EKG T AXIS: 54 DEGREES
EKG T AXIS: 60 DEGREES
EKG VENTRICULAR RATE: 58 BPM
EKG VENTRICULAR RATE: 63 BPM
EKG VENTRICULAR RATE: 70 BPM
EOSINOPHIL # BLD: 1 K/UL (ref 0–0.7)
EOSINOPHIL NFR BLD: 7 %
ERYTHROCYTE [DISTWIDTH] IN BLOOD BY AUTOMATED COUNT: 13.9 % (ref 11.5–14.5)
ESTIMATED AVERAGE GLUCOSE: 157 MG/DL
GLOBULIN SER CALC-MCNC: 2.2 G/DL (ref 2.3–3.5)
GLUCOSE BLD-MCNC: 116 MG/DL (ref 70–99)
GLUCOSE BLD-MCNC: 122 MG/DL (ref 70–99)
GLUCOSE BLD-MCNC: 156 MG/DL (ref 70–99)
GLUCOSE BLD-MCNC: 169 MG/DL (ref 70–99)
GLUCOSE SERPL-MCNC: 128 MG/DL (ref 70–99)
HBA1C MFR BLD: 7.1 % (ref 4–6)
HCT VFR BLD AUTO: 36.9 % (ref 42–52)
HGB BLD-MCNC: 12 G/DL (ref 14–18)
LYMPHOCYTES # BLD: 2.2 K/UL (ref 1–4.8)
LYMPHOCYTES NFR BLD: 15.1 %
MAGNESIUM SERPL-MCNC: 1.9 MG/DL (ref 1.7–2.4)
MCH RBC QN AUTO: 33.5 PG (ref 27–31.3)
MCHC RBC AUTO-ENTMCNC: 32.5 % (ref 33–37)
MCV RBC AUTO: 103.1 FL (ref 79–92.2)
MONOCYTES # BLD: 1.2 K/UL (ref 0.2–0.8)
MONOCYTES NFR BLD: 8.3 %
NEUTROPHILS # BLD: 10.1 K/UL (ref 1.4–6.5)
NEUTS SEG NFR BLD: 68.5 %
PERFORMED ON: ABNORMAL
PLATELET # BLD AUTO: 240 K/UL (ref 130–400)
POTASSIUM SERPL-SCNC: 5 MEQ/L (ref 3.4–4.9)
PROT SERPL-MCNC: 6.2 G/DL (ref 6.3–8)
RBC # BLD AUTO: 3.58 M/UL (ref 4.7–6.1)
SODIUM SERPL-SCNC: 141 MEQ/L (ref 135–144)
TROPONIN, HIGH SENSITIVITY: 18 NG/L (ref 0–19)
WBC # BLD AUTO: 14.7 K/UL (ref 4.8–10.8)

## 2024-08-26 PROCEDURE — 85025 COMPLETE CBC W/AUTO DIFF WBC: CPT

## 2024-08-26 PROCEDURE — 84484 ASSAY OF TROPONIN QUANT: CPT

## 2024-08-26 PROCEDURE — 70498 CT ANGIOGRAPHY NECK: CPT

## 2024-08-26 PROCEDURE — 6360000004 HC RX CONTRAST MEDICATION: Performed by: INTERNAL MEDICINE

## 2024-08-26 PROCEDURE — 80053 COMPREHEN METABOLIC PANEL: CPT

## 2024-08-26 PROCEDURE — 99223 1ST HOSP IP/OBS HIGH 75: CPT | Performed by: INTERNAL MEDICINE

## 2024-08-26 PROCEDURE — 87040 BLOOD CULTURE FOR BACTERIA: CPT

## 2024-08-26 PROCEDURE — 83036 HEMOGLOBIN GLYCOSYLATED A1C: CPT

## 2024-08-26 PROCEDURE — 2000000000 HC ICU R&B

## 2024-08-26 PROCEDURE — 93880 EXTRACRANIAL BILAT STUDY: CPT

## 2024-08-26 PROCEDURE — 36415 COLL VENOUS BLD VENIPUNCTURE: CPT

## 2024-08-26 PROCEDURE — 93306 TTE W/DOPPLER COMPLETE: CPT | Performed by: INTERNAL MEDICINE

## 2024-08-26 PROCEDURE — 6370000000 HC RX 637 (ALT 250 FOR IP): Performed by: INTERNAL MEDICINE

## 2024-08-26 PROCEDURE — 93010 ELECTROCARDIOGRAM REPORT: CPT | Performed by: INTERNAL MEDICINE

## 2024-08-26 PROCEDURE — 99291 CRITICAL CARE FIRST HOUR: CPT | Performed by: INTERNAL MEDICINE

## 2024-08-26 PROCEDURE — 83735 ASSAY OF MAGNESIUM: CPT

## 2024-08-26 PROCEDURE — 93306 TTE W/DOPPLER COMPLETE: CPT

## 2024-08-26 PROCEDURE — 2580000003 HC RX 258: Performed by: INTERNAL MEDICINE

## 2024-08-26 PROCEDURE — 93880 EXTRACRANIAL BILAT STUDY: CPT | Performed by: INTERNAL MEDICINE

## 2024-08-26 PROCEDURE — 93005 ELECTROCARDIOGRAM TRACING: CPT | Performed by: INTERNAL MEDICINE

## 2024-08-26 PROCEDURE — 2580000003 HC RX 258: Performed by: STUDENT IN AN ORGANIZED HEALTH CARE EDUCATION/TRAINING PROGRAM

## 2024-08-26 PROCEDURE — 6370000000 HC RX 637 (ALT 250 FOR IP): Performed by: STUDENT IN AN ORGANIZED HEALTH CARE EDUCATION/TRAINING PROGRAM

## 2024-08-26 RX ORDER — GLUCAGON 1 MG/ML
1 KIT INJECTION PRN
Status: DISCONTINUED | OUTPATIENT
Start: 2024-08-26 | End: 2024-08-27 | Stop reason: HOSPADM

## 2024-08-26 RX ORDER — INSULIN LISPRO 100 [IU]/ML
0-8 INJECTION, SOLUTION INTRAVENOUS; SUBCUTANEOUS
Status: DISCONTINUED | OUTPATIENT
Start: 2024-08-26 | End: 2024-08-26

## 2024-08-26 RX ORDER — METOPROLOL SUCCINATE 25 MG/1
25 TABLET, EXTENDED RELEASE ORAL DAILY
Status: DISCONTINUED | OUTPATIENT
Start: 2024-08-26 | End: 2024-08-27

## 2024-08-26 RX ORDER — VALACYCLOVIR HYDROCHLORIDE 1 G/1
500 TABLET, FILM COATED ORAL DAILY
Status: DISCONTINUED | OUTPATIENT
Start: 2024-08-26 | End: 2024-08-27 | Stop reason: HOSPADM

## 2024-08-26 RX ORDER — INSULIN LISPRO 100 [IU]/ML
0.05 INJECTION, SOLUTION INTRAVENOUS; SUBCUTANEOUS
Status: DISCONTINUED | OUTPATIENT
Start: 2024-08-26 | End: 2024-08-27 | Stop reason: HOSPADM

## 2024-08-26 RX ORDER — DEXTROSE MONOHYDRATE 100 MG/ML
INJECTION, SOLUTION INTRAVENOUS CONTINUOUS PRN
Status: DISCONTINUED | OUTPATIENT
Start: 2024-08-26 | End: 2024-08-26

## 2024-08-26 RX ORDER — NITROGLYCERIN 0.4 MG/1
0.4 TABLET SUBLINGUAL ONCE
Status: DISCONTINUED | OUTPATIENT
Start: 2024-08-26 | End: 2024-08-27 | Stop reason: HOSPADM

## 2024-08-26 RX ORDER — IOPAMIDOL 755 MG/ML
75 INJECTION, SOLUTION INTRAVASCULAR
Status: COMPLETED | OUTPATIENT
Start: 2024-08-26 | End: 2024-08-26

## 2024-08-26 RX ORDER — ATORVASTATIN CALCIUM 80 MG/1
80 TABLET, FILM COATED ORAL NIGHTLY
Status: DISCONTINUED | OUTPATIENT
Start: 2024-08-26 | End: 2024-08-27 | Stop reason: HOSPADM

## 2024-08-26 RX ORDER — SODIUM CHLORIDE 9 MG/ML
INJECTION, SOLUTION INTRAVENOUS CONTINUOUS
Status: DISCONTINUED | OUTPATIENT
Start: 2024-08-26 | End: 2024-08-26

## 2024-08-26 RX ORDER — VALACYCLOVIR HYDROCHLORIDE 500 MG/1
500 TABLET, FILM COATED ORAL 2 TIMES DAILY
COMMUNITY

## 2024-08-26 RX ORDER — INSULIN LISPRO 100 [IU]/ML
0-4 INJECTION, SOLUTION INTRAVENOUS; SUBCUTANEOUS NIGHTLY
Status: DISCONTINUED | OUTPATIENT
Start: 2024-08-26 | End: 2024-08-26

## 2024-08-26 RX ADMIN — VALACYCLOVIR 500 MG: 1 TABLET, FILM COATED ORAL at 16:09

## 2024-08-26 RX ADMIN — SODIUM CHLORIDE, PRESERVATIVE FREE 10 ML: 5 INJECTION INTRAVENOUS at 20:42

## 2024-08-26 RX ADMIN — RIVAROXABAN 2.5 MG: 2.5 TABLET, FILM COATED ORAL at 08:53

## 2024-08-26 RX ADMIN — ASPIRIN 81 MG: 81 TABLET, COATED ORAL at 08:53

## 2024-08-26 RX ADMIN — IOPAMIDOL 75 ML: 755 INJECTION, SOLUTION INTRAVENOUS at 15:22

## 2024-08-26 RX ADMIN — SODIUM CHLORIDE: 9 INJECTION, SOLUTION INTRAVENOUS at 08:58

## 2024-08-26 RX ADMIN — ATORVASTATIN CALCIUM 80 MG: 80 TABLET, FILM COATED ORAL at 20:39

## 2024-08-26 ASSESSMENT — PAIN SCALES - GENERAL
PAINLEVEL_OUTOF10: 0

## 2024-08-26 ASSESSMENT — ENCOUNTER SYMPTOMS
CHEST TIGHTNESS: 1
COUGH: 0
EYES NEGATIVE: 1
BLOOD IN STOOL: 0
WHEEZING: 0
NAUSEA: 0
SHORTNESS OF BREATH: 0
GASTROINTESTINAL NEGATIVE: 1
STRIDOR: 0

## 2024-08-26 NOTE — CARE COORDINATION
Team ICU rounds done this am at bedside. Pt from home w spouse and has cane. We will follow for any future dc needs and see how he does once he is allowed up.

## 2024-08-26 NOTE — H&P (VIEW-ONLY)
Inpatient consult to Cardiology  Consult performed by: Luis Betancourt MD  Consult ordered by: Sue Benjamin MD          Patient Name: Edwin Plata  Admit Date: 2024 12:10 PM  MR #: [id number]  : 1950    Attending Physician: Sue Benjamin MD  Reason for consult: CP    History of Presenting Illness:      Edwin Plata is a 73 y.o. male on hospital day 1 with a history of .   History Obtained From:  patient, electronic medical record    Admitted with syncopal event while having lunch w wife.  He became LH Diaphoretic Nauseated lading to LOC.  He was Placido 40 upon EMS Arrival.  Wife indicated that he normally does not maintain hydration.     He was Hypotensive and thus placed on Levophed and admitted to ICU.   We are asked to see pt due to CP.  he has experienced Mid sternal CP ( Pressure) that radiates to left chest.  He has had numerous such CP at home. Over recent 1-2 weeks he has taken multiple SL NTG with relief.     ECG During CP no acute changes SR 58.  HS Troponin 33>31>18.     He had 7 prior stents then subsequent CABG x4 2019 : Radial to LAD, LIMA to diagnoal coming off the ruvalcaba of the vein to OM,SVG to PDA .  No Furhter Cath since CABG.     He has h/o Lone AF during a PCI remotely.   He has LCEA .  PAD w remote LCFA and Profunda Stent. Was on on Xarelto 10 QD but since admission changed to 2.5 bid.  PAD dose. Last dose this AM.   Recent Echo EF 50% With Moderate AS with Mean Gr in 20s.     He is off Levo Currently.   History:      EKG:  Past Medical History:   Diagnosis Date    Acute MI (HCC)     Alcoholic (HCC)     previous very heavy drinker    CAD (coronary artery disease)     Chest pain     [facility] without MI    Colon polyps     adenomatous    Diabetes mellitus, type II (HCC)     History of alcoholism (HCC) 2015    History of tobacco abuse 2015    Hyperlipidemia     Hypertension     S/P coronary artery stent placement 2015    TOTAL OF 8 STENTS  - 5 stents placed  exam:->syncope Decision Support Exception - unselect if not a suspected or confirmed emergency medical condition->Emergency Medical Condition (MA) What reading provider will be dictating this exam?->CRC FINDINGS: BRAIN/VENTRICLES: There is no acute intracranial hemorrhage, mass effect or midline shift.  No abnormal extra-axial fluid collection.  The gray-white differentiation is maintained without evidence of an acute infarct.  There is no evidence of hydrocephalus. ORBITS: The visualized portion of the orbits demonstrate no acute abnormality. SINUSES: The visualized paranasal sinuses and mastoid air cells demonstrate no acute abnormality. SOFT TISSUES/SKULL:  No acute abnormality of the visualized skull or soft tissues.     No acute intracranial abnormality.     XR CHEST PORTABLE    Result Date: 8/25/2024  EXAMINATION: ONE XRAY VIEW OF THE CHEST 8/25/2024 12:54 pm COMPARISON: None. HISTORY: ORDERING SYSTEM PROVIDED HISTORY: SYNCOPE TECHNOLOGIST PROVIDED HISTORY: Reason for exam:->SYNCOPE What reading provider will be dictating this exam?->CRC FINDINGS: The lungs are without acute focal process.  There is no effusion or pneumothorax. The cardiomediastinal silhouette is without acute process. The osseous structures are without acute process. Postoperative sternotomy changes are noted.     No acute process.     XR HIP GENERAL 3V PELV/AP/LAT RIGHT    Result Date: 8/5/2024  * * *Final Report* * * DATE OF EXAM: Jul 31 2024  1:10PM   SVX   [id number]  -  XR HIP 3V PELV+ AP/LAT RT  / ACCESSION #  [id number] PROCEDURE REASON: Lateral pain of hip      * * * * Physician Interpretation * * * *  EXAMINATION / TECHNIQUE:  XR HIP 3V PELV+ AP/LAT RT PATIENT/TECHNOLOGIST PROVIDED HISTORY:   lateral right hip pain CLINICAL INFORMATION (AS PROVIDED BY ORDERING CLINICIAN) :  Lateral pain of hip COMPARISON: 05/09/2024 RESULT: Increased volume of calcium hydroxyapatite deposition along the right greater trochanter.  No acute fracture or

## 2024-08-26 NOTE — PROGRESS NOTES
Shift Summary    Patient a/ox4. Continues on low dose levo. SB on monitor, hr 50's. Pt complaining of chest pain after sitting at side of bed then transferring back into bed. Spo2 at that time mid 80s, 2L NC applied. Pt feels chest pain slowly improving while at rest. Dr Sandoval updated, gave order for EKG, troponin, and nitro x1. Pt refused nitro,  states no longer feels any chest pain. Afebrile. Shift uneventful otherwise

## 2024-08-26 NOTE — CARE COORDINATION
Case Management Assessment  Initial Evaluation    Date/Time of Evaluation: 8/25/2024 9:32 PM  Assessment Completed by: Domitila Osorio RN    If patient is discharged prior to next notation, then this note serves as note for discharge by case management.    Patient Name: Edwin Plata                   YOB: 1950  Diagnosis: Syncope and collapse [R55]  Hypotension [I95.9]  DI (acute kidney injury) (HCC) [N17.9]  Hypotension, unspecified hypotension type [I95.9]                   Date / Time: 8/25/2024 12:10 PM    Patient Admission Status: Inpatient   Readmission Risk (Low < 19, Mod (19-27), High > 27): Readmission Risk Score: 13    Current PCP: Yao Garcia MD  PCP verified by CM? (P) Yes    Chart Reviewed: Yes      History Provided by: (P) Patient  Patient Orientation: (P) Alert and Oriented, Person, Place, Situation, Self    Patient Cognition: (P) Alert    Hospitalization in the last 30 days (Readmission):  No    If yes, Readmission Assessment in  Navigator will be completed.    Advance Directives:      Code Status: Full Code   Patient's Primary Decision Maker is: (P) Legal Next of Kin (wife.)      Discharge Planning:    Patient lives with: (P) Spouse/Significant Other Type of Home: (P) House  Primary Care Giver: (P) Self  Patient Support Systems include: (P) Spouse/Significant Other   Current Financial resources: (P) Medicare  Current community resources: (P) None  Current services prior to admission: (P) None            Current DME:              Type of Home Care services:  (P) None    ADLS  Prior functional level: (P) Independent in ADLs/IADLs  Current functional level: (P) Independent in ADLs/IADLs    PT AM-PAC:   /24  OT AM-PAC:   /24    Family can provide assistance at DC: (P) Yes  Would you like Case Management to discuss the discharge plan with any other family members/significant others, and if so, who? (P) Yes (wife.)  Plans to Return to Present Housing: (P) Yes  Other Identified

## 2024-08-26 NOTE — CONSULTS
Pulmonary and Critical Care Medicine  Consult Note  Encounter Date: 2024 8:59 AM    Mr. Edwin Plata is a 73 y.o. male  : 1950  Requesting Provider: Sue Benjamin MD    Reason for request: ICU management         hyperlipidemia, CAD, hypertension, EtOH abuse, type II DM, CAD s/p PCI, ischemic cardiomyopathy, chronic back pain, CHF (LVEF 54± 5%, 2024), PAD     HISTORY OF PRESENT ILLNESS:    Patient is 73 y.o. presents with hypotension, patient was admitted yesterday due to chest pain and bradycardia with hypotension, his heart rate was in the low 40s, he has extensive cardiac history, he reported nausea and syncopal episode.  He reports history of recurrent chest pain requiring nitro sometimes waking him up from sleep, he has history of sleep apnea and could not tolerate CPAP in the past.  He received 1 L normal saline in ED.  No fever or chills, currently no shortness of breath, no worsening lower extremity edema.          Past Medical History:        Diagnosis Date    Acute MI ([facility])     Alcoholic ([facility])     previous very heavy drinker    CAD (coronary artery disease)     Chest pain     [facility] without MI    Colon polyps     adenomatous    Diabetes mellitus, type II ([facility])     History of alcoholism ([facility]) 2015    History of tobacco abuse 2015    Hyperlipidemia     Hypertension     S/P coronary artery stent placement 2015    TOTAL OF 8 STENTS  - 5 stents placed 2/16/15 XIENCE (LAD), PROMUS (LAD) - 2 stents placed 4/23/15PROMUS (LAD)    Unspecified sleep apnea        Past Surgical History:        Procedure Laterality Date    COLONOSCOPY      Diverticulosis    CORONARY ANGIOPLASTY      CORONARY ANGIOPLASTY WITH STENT PLACEMENT      CORONARY ARTERY BYPASS GRAFT      EYE SURGERY      Bilaterl cataracts    HC L HEART W LV & CORONARY  2017         MANDIBLE SURGERY      Surgery for sleep apnea    PERCUTANEOUS CORONARY INTERVENTION  2017         ROTATOR CUFF  alcoholism  Diabetes  Peripheral vascular disease/claudication      Recommendation    Continue cardioprotective medication  Levophed on standby  Nitro drip  Sleep study as outpatient  Sniff test as outpatient  Monitor closely in ICU  Appreciate cardiology  Target blood sugar 1 40-1 80  Watch volume status, maintain euvolemic       Thank you for consultation  Due to the immediate potential for life-threatening deterioration due to unstable angina/shock, I spent 35 minutes providing critical care.  This time is excluding time spent performing procedures.    Electronically signed by Abdulakdir Rowan MD, [facility],  on 8/26/2024 at 8:59 AM

## 2024-08-26 NOTE — PROGRESS NOTES
Spiritual Health Assessment/Progress Note  [facility]    Initial Encounter,  ,  ,      Name: Edwin Plata MRN: [id number]    Age: 73 y.o.     Sex: male   Language: English   Jainism: None   Hypotension     Date: 8/26/2024            Total Time Calculated: 15 min              Spiritual Assessment began in [facility] ICU        Referral/Consult From: Rounding   Encounter Overview/Reason: Initial Encounter  Service Provided For: Patient and family together    Trina, Belief, Meaning:   Patient has beliefs or practices that help with coping during difficult times  Family/Friends have beliefs or practices that help with coping during difficult times      Importance and Influence:  Patient has spiritual/personal beliefs that influence decisions regarding their health  Family/Friends have spiritual/personal beliefs that influence decisions regarding the patient's health    Community:  Patient feels well-supported. Support system includes: Spouse/Partner, Children, and Extended family  Family/Friends feel well-supported. Support system includes: Spouse/Partner, Children, and Extended family    Assessment and Plan of Care:     Patient Interventions include: Affirmed coping skills/support systems  Family/Friends Interventions include: Affirmed coping skills/support systems    Patient Plan of Care: Spiritual Care available upon further referral  Family/Friends Plan of Care: Spiritual Care available upon further referral    Electronically signed by Chaplain Eri on 8/26/2024 at 11:27 AM

## 2024-08-26 NOTE — FLOWSHEET NOTE
Arrived on unit and received handoff from Joe KRISHNA.   This patient was sleeping in bed comfortably. Stable vitals on 2 mcg/min of levophed.   Dr Benjamin rounded. Updated on night with chest pain episode. Discussed care needs.  Dr Rowan rounded shortly after.   Per patient chest pain happens more often at night during rest. Nitro relieves at home. Been ongoing issues with chest pain on and off, relieved with nitro at home. Dr Almaguer is his cardiologist. Has been following up with him.  Patient consult placed to cardiology.   Dr Betancourt rounded- Patient to have a heart cath tomorrow. Patient to rest in ICU. Dr Chua consulted.   Patient would like Dr Betancourt to speak with his [facility] cardiologist Dr Jose Alejandro Almaguer. Received information for Dr Betancourt to speak with him. His  is Lashawn and number is [phone]. Perfect served information to Dr Betancourt.   ECHO completed at bedside.   US of bilateral carotids done.   CTA of neck ordered.   BS stable.   Dr Benjamin reordered valtrex for eye.

## 2024-08-26 NOTE — PROGRESS NOTES
Patient is feeling while at this morning.  Intermittent chest pain.  1 overnight.  No shortness of breath.  No abdominal pain, nausea or vomiting.    ROS: 12 system review otherwise is negative for acute signs or symptoms over the last 24 hrs.     Exam: Awake, alert oriented, in no apparent distress  HEENT: Pink conjunctiva and buccal mucosa.  Normal and throat and nose  Neck: Supple, no nuchal rigidity.  Endo: No thyromegaly.  Vascular: No JVD or carotid bruit.  Chest: Clear to auscultation, no dullness to percussion.  Heart: Regular rate and rhythm, no extra sounds.  No murmur, no rub.  Abdomen: Soft, no tenderness, no rebound, no rigidity.  Clinically I could not exclude the possibility of intra-abdominal mass or organomegaly.  LE: No cyanosis or clubbing, no varices or edema.  Neuro: Awake, alert, oriented, normal speech, normal comprehension, normal extension, normal cranial nerves, normal and symmetrical motor and tone examination throughout.  MS: No joint effusion or tenderness.  Skin: No skin rash, itching, bruising or significant findings.      Assessment and plan:     *Acute kidney failure, improved with IV fluid.  Likely caused by hemodynamic shift and volume loss in the setting of patient taking lisinopril and Jardiance.  UA is bland.    *Hypotension.  Patient was taking blood pressure medication.  Hemodynamically on the dry side.  Responded to IV fluid.  He continues to require small dose of Levophed.    *Intermittent chest pain.  Negative troponin.  EKG does not show ST elevation or depression.  Patient has a history of 5 stents followed by CABG in 2019.  Patient also stated that he had cardiac cath a year ago at tertiary care center and reported to be unremarkable.  Requested echocardiogram.  Cardiac consultation    *Diabetes.  Resume metformin and Jardiance.  Add sliding scale    *Anemia, no evidence of acute blood loss.  May need investigation such as a GI investigation but could be done in the

## 2024-08-26 NOTE — ACP (ADVANCE CARE PLANNING)
Advance Care Planning     Advance Care Planning Activator (Inpatient)  Conversation Note      Date of ACP Conversation: 8/25/2024     Conversation Conducted with: Patient with Decision Making Capacity    ACP Activator: Domitila Osorio RN        Health Care Decision Maker:     Current Designated Health Care Decision Maker:     Primary Decision Maker: Lisa Butt - [facility] - [phone]

## 2024-08-26 NOTE — CONSULTS
Inpatient consult to Cardiology  Consult performed by: Luis Betancourt MD  Consult ordered by: Sue Benjamin MD          Patient Name: Edwin Plata  Admit Date: 2024 12:10 PM  MR #: [id number]  : 1950    Attending Physician: Sue Benjamin MD  Reason for consult: CP    History of Presenting Illness:      Edwin Plata is a 73 y.o. male on hospital day 1 with a history of .   History Obtained From:  patient, electronic medical record    Admitted with syncopal event while having lunch w wife.  He became LH Diaphoretic Nauseated lading to LOC.  He was Placido 40 upon EMS Arrival.  Wife indicated that he normally does not maintain hydration.     He was Hypotensive and thus placed on Levophed and admitted to ICU.   We are asked to see pt due to CP.  he has experienced Mid sternal CP ( Pressure) that radiates to left chest.  He has had numerous such CP at home. Over recent 1-2 weeks he has taken multiple SL NTG with relief.     ECG During CP no acute changes SR 58.  HS Troponin 33>31>18.     He had 7 prior stents then subsequent CABG x4 2019 : Radial to LAD, LIMA to diagnoal coming off the ruvalcaba of the vein to OM,SVG to PDA .  No Furhter Cath since CABG.     He has h/o Lone AF during a PCI remotely.   He has LCEA .  PAD w remote LCFA and Profunda Stent. Was on on Xarelto 10 QD but since admission changed to 2.5 bid.  PAD dose. Last dose this AM.   Recent Echo EF 50% With Moderate AS with Mean Gr in 20s.     He is off Levo Currently.   History:      EKG:  Past Medical History:   Diagnosis Date    Acute MI (HCC)     Alcoholic (HCC)     previous very heavy drinker    CAD (coronary artery disease)     Chest pain     [facility] without MI    Colon polyps     adenomatous    Diabetes mellitus, type II (HCC)     History of alcoholism (HCC) 2015    History of tobacco abuse 2015    Hyperlipidemia     Hypertension     S/P coronary artery stent placement 2015    TOTAL OF 8 STENTS  - 5 stents placed  exam:->syncope Decision Support Exception - unselect if not a suspected or confirmed emergency medical condition->Emergency Medical Condition (MA) What reading provider will be dictating this exam?->CRC FINDINGS: BRAIN/VENTRICLES: There is no acute intracranial hemorrhage, mass effect or midline shift.  No abnormal extra-axial fluid collection.  The gray-white differentiation is maintained without evidence of an acute infarct.  There is no evidence of hydrocephalus. ORBITS: The visualized portion of the orbits demonstrate no acute abnormality. SINUSES: The visualized paranasal sinuses and mastoid air cells demonstrate no acute abnormality. SOFT TISSUES/SKULL:  No acute abnormality of the visualized skull or soft tissues.     No acute intracranial abnormality.     XR CHEST PORTABLE    Result Date: 8/25/2024  EXAMINATION: ONE XRAY VIEW OF THE CHEST 8/25/2024 12:54 pm COMPARISON: None. HISTORY: ORDERING SYSTEM PROVIDED HISTORY: SYNCOPE TECHNOLOGIST PROVIDED HISTORY: Reason for exam:->SYNCOPE What reading provider will be dictating this exam?->CRC FINDINGS: The lungs are without acute focal process.  There is no effusion or pneumothorax. The cardiomediastinal silhouette is without acute process. The osseous structures are without acute process. Postoperative sternotomy changes are noted.     No acute process.     XR HIP GENERAL 3V PELV/AP/LAT RIGHT    Result Date: 8/5/2024  * * *Final Report* * * DATE OF EXAM: Jul 31 2024  1:10PM   SVX   5352  -  XR HIP 3V PELV+ AP/LAT RT  / ACCESSION #  [id number] PROCEDURE REASON: Lateral pain of hip      * * * * Physician Interpretation * * * *  EXAMINATION / TECHNIQUE:  XR HIP 3V PELV+ AP/LAT RT PATIENT/TECHNOLOGIST PROVIDED HISTORY:   lateral right hip pain CLINICAL INFORMATION (AS PROVIDED BY ORDERING CLINICIAN) :  Lateral pain of hip COMPARISON: 05/09/2024 RESULT: Increased volume of calcium hydroxyapatite deposition along the right greater trochanter.  No acute fracture or  dislocation.  Minimal osteophytic lipping along the right acetabulum and femoral head neck junction.  Mild left hip osteoarthritis.  Mild mechanical degenerative changes at the sacroiliac joints bilaterally.  Pubic symphysis is maintained.  Diffuse atherosclerotic vascular calcification.  Surgical clips project over the scrotum.    IMPRESSION: Increased calcium hydroxyapatite along the right gluteus medius/minimus insertions in keeping with calcific tendinosis. : EVERETTE   Transcribe Date/Time: Aug  5 2024 11:46A Dictated by : MATTHEW MALIK MD This examination was interpreted and the report reviewed and electronically signed by: MATTHEW MALIK MD on Aug  5 2024 11:49AM  EST      Active Hospital Problems    Diagnosis Date Noted    Hypotension [I95.9] 08/25/2024     Priority: Low         Impression/Plan:   Syncope - most likely Vasodepressor. Keep On Tele. No pauses nor any malignant arrhythmia thus far. Will check CUS.  CP -likely represents accelerating angina - ASA add BB add Statin.  Pt will benefit form Cath Bypass study.  I suspect he has a graft that is in Jeopardy. He did receive his Xarelto this AM.  We will plan tomorrow. Keep NPO post MN.  Hold Metformin.   HPL - Statin.   Moderate AS- continue out pt surveillance.  LVEF 50% in June 2024  LCEA s/p  PAD - Left Common Femoral Artery and Profunda Artery PCI      Thank you for allowing us to participate in the care of this patient.     Will continue to follow.    Please call if questions or concerns arise.    Electronically signed by MARY ALICE HAIRSTON MD on 8/26/2024 at 10:13 AM

## 2024-08-27 ENCOUNTER — APPOINTMENT (OUTPATIENT)
Dept: ULTRASOUND IMAGING | Age: 74
DRG: 314 | End: 2024-08-27
Payer: MEDICARE

## 2024-08-27 ENCOUNTER — HOSPITAL ENCOUNTER (INPATIENT)
Facility: HOSPITAL | Age: 74
LOS: 3 days | Discharge: HOME | End: 2024-08-30
Attending: INTERNAL MEDICINE | Admitting: INTERNAL MEDICINE
Payer: MEDICARE

## 2024-08-27 ENCOUNTER — APPOINTMENT (OUTPATIENT)
Dept: CARDIOLOGY | Facility: HOSPITAL | Age: 74
End: 2024-08-27
Payer: MEDICARE

## 2024-08-27 VITALS
OXYGEN SATURATION: 94 % | HEART RATE: 66 BPM | WEIGHT: 180 LBS | DIASTOLIC BLOOD PRESSURE: 47 MMHG | HEIGHT: 65 IN | TEMPERATURE: 98.7 F | SYSTOLIC BLOOD PRESSURE: 155 MMHG | RESPIRATION RATE: 19 BRPM | BODY MASS INDEX: 29.99 KG/M2

## 2024-08-27 DIAGNOSIS — I50.20 HFREF (HEART FAILURE WITH REDUCED EJECTION FRACTION) (MULTI): ICD-10-CM

## 2024-08-27 DIAGNOSIS — I73.9 PAD (PERIPHERAL ARTERY DISEASE) (CMS-HCC): ICD-10-CM

## 2024-08-27 DIAGNOSIS — I21.4 NSTEMI (NON-ST ELEVATED MYOCARDIAL INFARCTION) (MULTI): ICD-10-CM

## 2024-08-27 DIAGNOSIS — R55 SYNCOPE AND COLLAPSE: Primary | ICD-10-CM

## 2024-08-27 LAB
ALBUMIN SERPL BCP-MCNC: 3.7 G/DL (ref 3.4–5)
ALBUMIN SERPL BCP-MCNC: 4.1 G/DL (ref 3.4–5)
ALBUMIN SERPL-MCNC: 3.7 G/DL (ref 3.5–4.6)
ALP SERPL-CCNC: 74 U/L (ref 33–136)
ALT SERPL W P-5'-P-CCNC: 13 U/L (ref 10–52)
ANION GAP SERPL CALC-SCNC: 17 MMOL/L (ref 10–20)
ANION GAP SERPL CALC-SCNC: 18 MMOL/L (ref 10–20)
ANION GAP SERPL CALCULATED.3IONS-SCNC: 10 MEQ/L (ref 9–15)
APTT PPP: 31 SECONDS (ref 27–38)
AST SERPL W P-5'-P-CCNC: 17 U/L (ref 9–39)
BASOPHILS # BLD: 0.1 K/UL (ref 0–0.2)
BASOPHILS NFR BLD: 0.8 %
BILIRUB SERPL-MCNC: 0.6 MG/DL (ref 0–1.2)
BNP SERPL-MCNC: 166 PG/ML (ref 0–99)
BUN SERPL-MCNC: 13 MG/DL (ref 6–23)
BUN SERPL-MCNC: 15 MG/DL (ref 6–23)
BUN SERPL-MCNC: 19 MG/DL (ref 8–23)
CALCIUM SERPL-MCNC: 8.9 MG/DL (ref 8.5–9.9)
CALCIUM SERPL-MCNC: 8.9 MG/DL (ref 8.6–10.6)
CALCIUM SERPL-MCNC: 9.6 MG/DL (ref 8.6–10.6)
CARDIAC TROPONIN I PNL SERPL HS: 15 NG/L (ref 0–53)
CHLORIDE SERPL-SCNC: 102 MMOL/L (ref 98–107)
CHLORIDE SERPL-SCNC: 103 MMOL/L (ref 98–107)
CHLORIDE SERPL-SCNC: 107 MEQ/L (ref 95–107)
CO2 SERPL-SCNC: 23 MMOL/L (ref 21–32)
CO2 SERPL-SCNC: 23 MMOL/L (ref 21–32)
CO2 SERPL-SCNC: 25 MEQ/L (ref 20–31)
CREAT SERPL-MCNC: 0.76 MG/DL (ref 0.5–1.3)
CREAT SERPL-MCNC: 0.78 MG/DL (ref 0.5–1.3)
CREAT SERPL-MCNC: 0.92 MG/DL (ref 0.7–1.2)
ECHO BSA: 1.93 M2
EGFRCR SERPLBLD CKD-EPI 2021: >90 ML/MIN/1.73M*2
EGFRCR SERPLBLD CKD-EPI 2021: >90 ML/MIN/1.73M*2
EKG ATRIAL RATE: 70 BPM
EKG P AXIS: 45 DEGREES
EKG P-R INTERVAL: 190 MS
EKG Q-T INTERVAL: 396 MS
EKG QRS DURATION: 96 MS
EKG QTC CALCULATION (BAZETT): 427 MS
EKG R AXIS: -39 DEGREES
EKG T AXIS: 76 DEGREES
EKG VENTRICULAR RATE: 70 BPM
EOSINOPHIL # BLD: 0.8 K/UL (ref 0–0.7)
EOSINOPHIL NFR BLD: 8.6 %
ERYTHROCYTE [DISTWIDTH] IN BLOOD BY AUTOMATED COUNT: 14.1 % (ref 11.5–14.5)
ERYTHROCYTE [DISTWIDTH] IN BLOOD BY AUTOMATED COUNT: 14.1 % (ref 11.5–14.5)
GLUCOSE BLD-MCNC: 104 MG/DL (ref 70–99)
GLUCOSE SERPL-MCNC: 124 MG/DL (ref 70–99)
GLUCOSE SERPL-MCNC: 138 MG/DL (ref 74–99)
GLUCOSE SERPL-MCNC: 92 MG/DL (ref 74–99)
HCT VFR BLD AUTO: 36.5 % (ref 42–52)
HCT VFR BLD AUTO: 38.3 % (ref 41–52)
HGB BLD-MCNC: 11.6 G/DL (ref 14–18)
HGB BLD-MCNC: 11.9 G/DL (ref 13.5–17.5)
INR PPP: 1.1 (ref 0.9–1.1)
LACTATE SERPL-SCNC: 0.9 MMOL/L (ref 0.4–2)
LYMPHOCYTES # BLD: 1.9 K/UL (ref 1–4.8)
LYMPHOCYTES NFR BLD: 19.7 %
MAGNESIUM SERPL-MCNC: 1.65 MG/DL (ref 1.6–2.4)
MCH RBC QN AUTO: 32.7 PG (ref 26–34)
MCH RBC QN AUTO: 32.7 PG (ref 27–31.3)
MCHC RBC AUTO-ENTMCNC: 31.1 G/DL (ref 32–36)
MCHC RBC AUTO-ENTMCNC: 31.8 % (ref 33–37)
MCV RBC AUTO: 102.8 FL (ref 79–92.2)
MCV RBC AUTO: 105 FL (ref 80–100)
MONOCYTES # BLD: 0.8 K/UL (ref 0.2–0.8)
MONOCYTES NFR BLD: 8.7 %
NEUTROPHILS # BLD: 5.9 K/UL (ref 1.4–6.5)
NEUTS SEG NFR BLD: 61.8 %
NRBC BLD-RTO: 0 /100 WBCS (ref 0–0)
PERFORMED ON: ABNORMAL
PHOSPHATE SERPL-MCNC: 2.7 MG/DL (ref 2.3–4.8)
PHOSPHATE SERPL-MCNC: 2.9 MG/DL (ref 2.5–4.9)
PLATELET # BLD AUTO: 218 X10*3/UL (ref 150–450)
PLATELET # BLD AUTO: 220 K/UL (ref 130–400)
POTASSIUM SERPL-SCNC: 4.8 MEQ/L (ref 3.4–4.9)
POTASSIUM SERPL-SCNC: 4.8 MMOL/L (ref 3.5–5.3)
POTASSIUM SERPL-SCNC: 5.1 MMOL/L (ref 3.5–5.3)
PROT SERPL-MCNC: 6.3 G/DL (ref 6.4–8.2)
PROTHROMBIN TIME: 12.2 SECONDS (ref 9.8–12.8)
RBC # BLD AUTO: 3.55 M/UL (ref 4.7–6.1)
RBC # BLD AUTO: 3.64 X10*6/UL (ref 4.5–5.9)
SODIUM SERPL-SCNC: 138 MMOL/L (ref 136–145)
SODIUM SERPL-SCNC: 138 MMOL/L (ref 136–145)
SODIUM SERPL-SCNC: 142 MEQ/L (ref 135–144)
WBC # BLD AUTO: 10.8 X10*3/UL (ref 4.4–11.3)
WBC # BLD AUTO: 9.5 K/UL (ref 4.8–10.8)

## 2024-08-27 PROCEDURE — 80069 RENAL FUNCTION PANEL: CPT | Mod: CCI

## 2024-08-27 PROCEDURE — C1769 GUIDE WIRE: HCPCS | Performed by: INTERNAL MEDICINE

## 2024-08-27 PROCEDURE — 85027 COMPLETE CBC AUTOMATED: CPT | Performed by: STUDENT IN AN ORGANIZED HEALTH CARE EDUCATION/TRAINING PROGRAM

## 2024-08-27 PROCEDURE — 6370000000 HC RX 637 (ALT 250 FOR IP): Performed by: INTERNAL MEDICINE

## 2024-08-27 PROCEDURE — 2500000003 HC RX 250 WO HCPCS: Performed by: INTERNAL MEDICINE

## 2024-08-27 PROCEDURE — 36415 COLL VENOUS BLD VENIPUNCTURE: CPT

## 2024-08-27 PROCEDURE — 84484 ASSAY OF TROPONIN QUANT: CPT

## 2024-08-27 PROCEDURE — 99152 MOD SED SAME PHYS/QHP 5/>YRS: CPT | Performed by: INTERNAL MEDICINE

## 2024-08-27 PROCEDURE — 85610 PROTHROMBIN TIME: CPT | Performed by: STUDENT IN AN ORGANIZED HEALTH CARE EDUCATION/TRAINING PROGRAM

## 2024-08-27 PROCEDURE — 99233 SBSQ HOSP IP/OBS HIGH 50: CPT | Performed by: INTERNAL MEDICINE

## 2024-08-27 PROCEDURE — 2709999900 HC NON-CHARGEABLE SUPPLY: Performed by: INTERNAL MEDICINE

## 2024-08-27 PROCEDURE — 2500000001 HC RX 250 WO HCPCS SELF ADMINISTERED DRUGS (ALT 637 FOR MEDICARE OP)

## 2024-08-27 PROCEDURE — C1894 INTRO/SHEATH, NON-LASER: HCPCS | Performed by: INTERNAL MEDICINE

## 2024-08-27 PROCEDURE — 93005 ELECTROCARDIOGRAM TRACING: CPT

## 2024-08-27 PROCEDURE — 6370000000 HC RX 637 (ALT 250 FOR IP): Performed by: STUDENT IN AN ORGANIZED HEALTH CARE EDUCATION/TRAINING PROGRAM

## 2024-08-27 PROCEDURE — 2580000003 HC RX 258: Performed by: STUDENT IN AN ORGANIZED HEALTH CARE EDUCATION/TRAINING PROGRAM

## 2024-08-27 PROCEDURE — 2500000004 HC RX 250 GENERAL PHARMACY W/ HCPCS (ALT 636 FOR OP/ED)

## 2024-08-27 PROCEDURE — 83605 ASSAY OF LACTIC ACID: CPT

## 2024-08-27 PROCEDURE — 1100000001 HC PRIVATE ROOM DAILY

## 2024-08-27 PROCEDURE — 93010 ELECTROCARDIOGRAM REPORT: CPT | Performed by: INTERNAL MEDICINE

## 2024-08-27 PROCEDURE — 2580000003 HC RX 258: Performed by: INTERNAL MEDICINE

## 2024-08-27 PROCEDURE — 76937 US GUIDE VASCULAR ACCESS: CPT

## 2024-08-27 PROCEDURE — 6360000002 HC RX W HCPCS: Performed by: INTERNAL MEDICINE

## 2024-08-27 PROCEDURE — 83880 ASSAY OF NATRIURETIC PEPTIDE: CPT

## 2024-08-27 PROCEDURE — 85025 COMPLETE CBC W/AUTO DIFF WBC: CPT

## 2024-08-27 PROCEDURE — 99153 MOD SED SAME PHYS/QHP EA: CPT | Performed by: INTERNAL MEDICINE

## 2024-08-27 PROCEDURE — 93005 ELECTROCARDIOGRAM TRACING: CPT | Performed by: INTERNAL MEDICINE

## 2024-08-27 PROCEDURE — 80053 COMPREHEN METABOLIC PANEL: CPT

## 2024-08-27 PROCEDURE — 83735 ASSAY OF MAGNESIUM: CPT

## 2024-08-27 PROCEDURE — 99291 CRITICAL CARE FIRST HOUR: CPT | Performed by: INTERNAL MEDICINE

## 2024-08-27 PROCEDURE — 80069 RENAL FUNCTION PANEL: CPT

## 2024-08-27 PROCEDURE — 76937 US GUIDE VASCULAR ACCESS: CPT | Performed by: RADIOLOGY

## 2024-08-27 PROCEDURE — 99291 CRITICAL CARE FIRST HOUR: CPT

## 2024-08-27 RX ORDER — NITROGLYCERIN 0.4 MG/1
0.4 TABLET SUBLINGUAL EVERY 5 MIN PRN
Status: DISCONTINUED | OUTPATIENT
Start: 2024-08-27 | End: 2024-08-27 | Stop reason: HOSPADM

## 2024-08-27 RX ORDER — HEPARIN SODIUM 10000 [USP'U]/100ML
0-4000 INJECTION, SOLUTION INTRAVENOUS CONTINUOUS
Status: DISCONTINUED | OUTPATIENT
Start: 2024-08-27 | End: 2024-08-28

## 2024-08-27 RX ORDER — ONDANSETRON 2 MG/ML
4 INJECTION INTRAMUSCULAR; INTRAVENOUS EVERY 6 HOURS PRN
Status: DISCONTINUED | OUTPATIENT
Start: 2024-08-27 | End: 2024-08-27 | Stop reason: HOSPADM

## 2024-08-27 RX ORDER — SODIUM CHLORIDE 0.9 % (FLUSH) 0.9 %
5-40 SYRINGE (ML) INJECTION PRN
Status: DISCONTINUED | OUTPATIENT
Start: 2024-08-27 | End: 2024-08-27 | Stop reason: HOSPADM

## 2024-08-27 RX ORDER — NAPROXEN SODIUM 220 MG/1
81 TABLET, FILM COATED ORAL DAILY
Status: DISCONTINUED | OUTPATIENT
Start: 2024-08-28 | End: 2024-08-28

## 2024-08-27 RX ORDER — TALC
6 POWDER (GRAM) TOPICAL NIGHTLY PRN
Status: DISCONTINUED | OUTPATIENT
Start: 2024-08-27 | End: 2024-08-30 | Stop reason: HOSPADM

## 2024-08-27 RX ORDER — SODIUM CHLORIDE 9 MG/ML
INJECTION, SOLUTION INTRAVENOUS PRN
Status: DISCONTINUED | OUTPATIENT
Start: 2024-08-27 | End: 2024-08-27 | Stop reason: HOSPADM

## 2024-08-27 RX ORDER — MIDAZOLAM HYDROCHLORIDE 1 MG/ML
INJECTION INTRAMUSCULAR; INTRAVENOUS PRN
Status: DISCONTINUED | OUTPATIENT
Start: 2024-08-27 | End: 2024-08-27 | Stop reason: HOSPADM

## 2024-08-27 RX ORDER — SODIUM CHLORIDE 450 MG/100ML
75 INJECTION, SOLUTION INTRAVENOUS CONTINUOUS
Status: DISCONTINUED | OUTPATIENT
Start: 2024-08-27 | End: 2024-08-27 | Stop reason: HOSPADM

## 2024-08-27 RX ORDER — INSULIN LISPRO 100 [IU]/ML
0-5 INJECTION, SOLUTION INTRAVENOUS; SUBCUTANEOUS
Status: DISCONTINUED | OUTPATIENT
Start: 2024-08-28 | End: 2024-08-30

## 2024-08-27 RX ORDER — MAGNESIUM SULFATE HEPTAHYDRATE 40 MG/ML
4 INJECTION, SOLUTION INTRAVENOUS ONCE
Status: COMPLETED | OUTPATIENT
Start: 2024-08-27 | End: 2024-08-28

## 2024-08-27 RX ORDER — DIPHENHYDRAMINE HYDROCHLORIDE 50 MG/ML
50 INJECTION INTRAMUSCULAR; INTRAVENOUS ONCE
Status: DISCONTINUED | OUTPATIENT
Start: 2024-08-27 | End: 2024-08-27 | Stop reason: HOSPADM

## 2024-08-27 RX ORDER — METOPROLOL SUCCINATE 25 MG/1
25 TABLET, EXTENDED RELEASE ORAL DAILY
Status: DISCONTINUED | OUTPATIENT
Start: 2024-08-27 | End: 2024-08-27 | Stop reason: HOSPADM

## 2024-08-27 RX ORDER — SODIUM CHLORIDE 0.9 % (FLUSH) 0.9 %
5-40 SYRINGE (ML) INJECTION EVERY 12 HOURS SCHEDULED
Status: DISCONTINUED | OUTPATIENT
Start: 2024-08-27 | End: 2024-08-27 | Stop reason: HOSPADM

## 2024-08-27 RX ORDER — ATORVASTATIN CALCIUM 80 MG/1
80 TABLET, FILM COATED ORAL NIGHTLY
Status: DISCONTINUED | OUTPATIENT
Start: 2024-08-27 | End: 2024-08-30 | Stop reason: HOSPADM

## 2024-08-27 RX ORDER — SODIUM CHLORIDE 9 MG/ML
INJECTION, SOLUTION INTRAVENOUS CONTINUOUS PRN
Status: COMPLETED | OUTPATIENT
Start: 2024-08-27 | End: 2024-08-27

## 2024-08-27 RX ADMIN — SODIUM CHLORIDE, PRESERVATIVE FREE 10 ML: 5 INJECTION INTRAVENOUS at 12:03

## 2024-08-27 RX ADMIN — VALACYCLOVIR 500 MG: 1 TABLET, FILM COATED ORAL at 12:02

## 2024-08-27 RX ADMIN — ASPIRIN 81 MG: 81 TABLET, COATED ORAL at 12:02

## 2024-08-27 SDOH — SOCIAL STABILITY: SOCIAL INSECURITY: HAVE YOU HAD ANY THOUGHTS OF HARMING ANYONE ELSE?: NO

## 2024-08-27 SDOH — SOCIAL STABILITY: SOCIAL INSECURITY: ARE YOU OR HAVE YOU BEEN THREATENED OR ABUSED PHYSICALLY, EMOTIONALLY, OR SEXUALLY BY ANYONE?: NO

## 2024-08-27 SDOH — SOCIAL STABILITY: SOCIAL INSECURITY: WERE YOU ABLE TO COMPLETE ALL THE BEHAVIORAL HEALTH SCREENINGS?: YES

## 2024-08-27 SDOH — SOCIAL STABILITY: SOCIAL INSECURITY: HAVE YOU HAD THOUGHTS OF HARMING ANYONE ELSE?: NO

## 2024-08-27 SDOH — SOCIAL STABILITY: SOCIAL INSECURITY: DO YOU FEEL ANYONE HAS EXPLOITED OR TAKEN ADVANTAGE OF YOU FINANCIALLY OR OF YOUR PERSONAL PROPERTY?: NO

## 2024-08-27 SDOH — SOCIAL STABILITY: SOCIAL INSECURITY: ARE THERE ANY APPARENT SIGNS OF INJURIES/BEHAVIORS THAT COULD BE RELATED TO ABUSE/NEGLECT?: NO

## 2024-08-27 SDOH — SOCIAL STABILITY: SOCIAL INSECURITY: DOES ANYONE TRY TO KEEP YOU FROM HAVING/CONTACTING OTHER FRIENDS OR DOING THINGS OUTSIDE YOUR HOME?: NO

## 2024-08-27 SDOH — SOCIAL STABILITY: SOCIAL INSECURITY: DO YOU FEEL UNSAFE GOING BACK TO THE PLACE WHERE YOU ARE LIVING?: NO

## 2024-08-27 SDOH — SOCIAL STABILITY: SOCIAL INSECURITY: HAS ANYONE EVER THREATENED TO HURT YOUR FAMILY OR YOUR PETS?: NO

## 2024-08-27 SDOH — SOCIAL STABILITY: SOCIAL INSECURITY: ABUSE: ADULT

## 2024-08-27 ASSESSMENT — LIFESTYLE VARIABLES
SKIP TO QUESTIONS 9-10: 1
HOW OFTEN DO YOU HAVE A DRINK CONTAINING ALCOHOL: NEVER
SUBSTANCE_ABUSE_PAST_12_MONTHS: NO
HOW MANY STANDARD DRINKS CONTAINING ALCOHOL DO YOU HAVE ON A TYPICAL DAY: PATIENT DOES NOT DRINK
PRESCIPTION_ABUSE_PAST_12_MONTHS: NO
HOW OFTEN DO YOU HAVE 6 OR MORE DRINKS ON ONE OCCASION: NEVER
AUDIT-C TOTAL SCORE: 0
AUDIT-C TOTAL SCORE: 0

## 2024-08-27 ASSESSMENT — ACTIVITIES OF DAILY LIVING (ADL)
WALKS IN HOME: INDEPENDENT
PATIENT'S MEMORY ADEQUATE TO SAFELY COMPLETE DAILY ACTIVITIES?: YES
JUDGMENT_ADEQUATE_SAFELY_COMPLETE_DAILY_ACTIVITIES: YES
GROOMING: INDEPENDENT
LACK_OF_TRANSPORTATION: NO
HEARING - LEFT EAR: DIFFICULTY WITH NOISE
FEEDING YOURSELF: INDEPENDENT
HEARING - RIGHT EAR: DIFFICULTY WITH NOISE
ADEQUATE_TO_COMPLETE_ADL: YES
BATHING: INDEPENDENT
DRESSING YOURSELF: INDEPENDENT
TOILETING: INDEPENDENT

## 2024-08-27 ASSESSMENT — ENCOUNTER SYMPTOMS
BLOOD IN STOOL: 0
EYES NEGATIVE: 1
SHORTNESS OF BREATH: 0
COUGH: 0
NAUSEA: 0
STRIDOR: 0
CHEST TIGHTNESS: 1
GASTROINTESTINAL NEGATIVE: 1
WHEEZING: 0

## 2024-08-27 ASSESSMENT — COGNITIVE AND FUNCTIONAL STATUS - GENERAL
MOBILITY SCORE: 22
PATIENT BASELINE BEDBOUND: NO
MOVING TO AND FROM BED TO CHAIR: A LITTLE
DAILY ACTIVITIY SCORE: 24
WALKING IN HOSPITAL ROOM: A LITTLE

## 2024-08-27 ASSESSMENT — PAIN SCALES - GENERAL
PAINLEVEL_OUTOF10: 0 - NO PAIN
PAINLEVEL_OUTOF10: 0
PAINLEVEL_OUTOF10: 0 - NO PAIN
PAINLEVEL_OUTOF10: 0
PAINLEVEL_OUTOF10: 0

## 2024-08-27 ASSESSMENT — PATIENT HEALTH QUESTIONNAIRE - PHQ9
SUM OF ALL RESPONSES TO PHQ9 QUESTIONS 1 & 2: 0
1. LITTLE INTEREST OR PLEASURE IN DOING THINGS: NOT AT ALL
2. FEELING DOWN, DEPRESSED OR HOPELESS: NOT AT ALL

## 2024-08-27 ASSESSMENT — COLUMBIA-SUICIDE SEVERITY RATING SCALE - C-SSRS
1. IN THE PAST MONTH, HAVE YOU WISHED YOU WERE DEAD OR WISHED YOU COULD GO TO SLEEP AND NOT WAKE UP?: NO
6. HAVE YOU EVER DONE ANYTHING, STARTED TO DO ANYTHING, OR PREPARED TO DO ANYTHING TO END YOUR LIFE?: NO
2. HAVE YOU ACTUALLY HAD ANY THOUGHTS OF KILLING YOURSELF?: NO

## 2024-08-27 ASSESSMENT — PAIN - FUNCTIONAL ASSESSMENT
PAIN_FUNCTIONAL_ASSESSMENT: 0-10
PAIN_FUNCTIONAL_ASSESSMENT: 0-10

## 2024-08-27 NOTE — CARE COORDINATION
Team ICU rounds done at bedside this am.  discussed w/ pt need for tx to tertiary care for vascular needs unable to be done here.   11am- I spoke to Dr. Benjamin and he is initiating tx to  and did contact tx center.

## 2024-08-27 NOTE — Clinical Note
Sheath was exchanged in the right femoral artery with SHEATH, PINNACLE, 10 CM,  6FR INTRODUCER, 6FR BETH, 2.5 CM DIALATOR.

## 2024-08-27 NOTE — PROGRESS NOTES
Patient is doing well.  No chest pain overnight.  Off Levophed.  Blood pressure systolically is 124.    ROS: 12 system review otherwise is negative for acute signs or symptoms over the last 24 hrs.     Exam: Awake, alert oriented, in no apparent distress  HEENT: Pink conjunctiva and buccal mucosa.  Normal and throat and nose  Neck: Supple, no nuchal rigidity.  Endo: No thyromegaly.  Vascular: No JVD or carotid bruit.  Chest: Clear to auscultation, no dullness to percussion.  Heart: Regular rate and rhythm, no extra sounds.  No murmur, no rub.  Abdomen: Soft, no tenderness, no rebound, no rigidity.  Clinically I could not exclude the possibility of intra-abdominal mass or organomegaly.  LE: No cyanosis or clubbing, no varices or edema.  Neuro: Awake, alert, oriented, normal speech, normal comprehension, normal extension, normal cranial nerves, normal and symmetrical motor and tone examination throughout.  MS: No joint effusion or tenderness.  Skin: No skin rash, itching, bruising or significant findings.      Assessment and plan:     *Acute kidney failure, improved with IV fluid.  Likely caused by hemodynamic shift and volume loss in the setting of patient taking lisinopril and Jardiance.  UA is bland.    *Hypotension.  Resolved.    *Intermittent chest pain.  Negative troponin.  EKG does not show ST elevation or depression.  Patient has a history of 5 stents followed by CABG in 2019.  Cardiac cath this morning.    *Diabetes.  Resume metformin and Jardiance.  Add sliding scale    *Anemia, no evidence of acute blood loss.  May need investigation such as a GI investigation but could be done in the outpatient setting.    *Leukocytosis, afebrile, no evidence of infection at this time.  UA is negative.  Chest x-ray negative.  No skin infection.  Continue to monitor.  Requested blood culture.  Leukocytosis had resolved.  Blood cultures pending.    *Few other medical issues not listed above    Medical care while patient is

## 2024-08-27 NOTE — PROGRESS NOTES
Sedation Pre- Procedure Evaluation    Patient name: Edwin Plata  YOB: 1950  MRN: [id number]   Allergies:   Patient has no known allergies.        Type Of Sedation  [x]local anesthesia  [x]moderate (conscious sedation)  []deep/analgesia      RN Focused History    Yes        No  N/A  []   [x]  Previous problem with airway anesthesia, sedation, or family history of the same    []   [x]  History Of tobacco, alcohol, or substance abuse     [x]   []  Problems associated with stridor, snoring, or sleep apnea    []   [] [x] Pediatric patient, history of premature birth or ventilator support (Moderate Sedation Only)     [x]   [] [] Moderate Sedation: Clear liquids within 6 hours of sedation and NPO within 2 hours    []   [] [x] Deep Sedation:Clear liquids within 6 hours of sedation and NPO within 2 hours      NPO since: midnight       Vitals, Cardiac Rhythm, Pain:   Vitals  Temp: 98.7 °F (37.1 °C)  Temp Source: Oral  Pulse: 73  Heart Rate Source: Monitor  Respirations: 22  BP: (!) 124/36  MAP (Calculated): 65  MAP (mmHg): 68  BP Location: Right upper arm  BP Upper/Lower: Upper  BP Method: Automatic  Patient Position: Semi fowlers  Cardiac Rhythm: Sinus rhythm  Pain Assessment  Pain Assessment: None - Denies Pain  Pain Level: 0  Pain Location: Chest  Pain Descriptors: Aching  Pain Type: Acute pain          Solomon Scale:       Activity:  2 - Able to move 4 extremities voluntarily on command  Respiration:  2 - Able to breathe deeply and cough freely  Circulation:  2 - BP+/- 20mmHg of normal  Consciousness:  2 - Fully awake  Oxygen Saturation (color):  1 - Needs oxygen to maintain oxygen saturation >90%      Prior to Admission medications    Medication Sig Start Date End Date Taking? Authorizing Provider   valACYclovir (VALTREX) 500 MG tablet Take 1 tablet by mouth 2 times daily   Yes ProviderAlla MD   Empagliflozin (JARDIANCE PO) Take by mouth   Yes ProviderAlla MD   rivaroxaban (XARELTO) 10 MG  TABS tablet Take 1 tablet by mouth   Yes Alla Owusu MD   lisinopril (PRINIVIL;ZESTRIL) 10 MG tablet Take 1 tablet by mouth daily   Yes Alla Owusu MD   metFORMIN (GLUCOPHAGE) 1000 MG tablet Take 1 tablet by mouth 2 times daily (with meals) 8/24/16  Yes Sukhdev Amor MD   nitroGLYCERIN (NITROSTAT) 0.4 MG SL tablet Place 1 tablet under the tongue every 5 minutes as needed. 9/26/12  Yes Adriano Cota MD   aspirin EC 81 MG EC tablet Take 1 tablet by mouth daily   Yes Alla Owusu MD   gemfibrozil (LOPID) 600 MG tablet Take 600 mg by mouth 2 times daily (before meals)  Patient not taking: Reported on 5/30/2023    Alla Owusu MD   glimepiride (AMARYL) 1 MG tablet TAKE ONE TABLET BY MOUTH IN THE MORNING before breakfast  Patient not taking: Reported on 5/30/2023 9/15/17   Sukhdev Amor MD   simvastatin (ZOCOR) 40 MG tablet Take 1 tablet by mouth nightly  Patient not taking: Reported on 5/30/2023 2/3/17   Sukhdev Amor MD   clopidogrel (PLAVIX) 75 MG tablet Take 1 tablet by mouth daily.  Patient not taking: Reported on 5/30/2023 11/8/12   Adriano Cota MD   benzonatate (TESSALON PERLES) 100 MG capsule Take 1 capsule by mouth every 6 hours as needed for Cough.  Patient not taking: Reported on 8/25/2024 9/26/12   Adriano Cota MD   meloxicam (MOBIC) 15 MG tablet Take 1 tablet by mouth daily. 4/2/12 4/2/13  Adriano Cota MD   diltiazem (CARDIZEM CD) 240 MG ER capsule Take 1 capsule by mouth daily  Patient not taking: Reported on 8/25/2024    Alla Owusu MD        Electronically signed by Zoie Stanford RN on 8/27/2024 at 7:36 AM

## 2024-08-27 NOTE — INTERVAL H&P NOTE
Update History & Physical    The patient's History and Physical of August 27, Chart was reviewed with the patient and I examined the patient. There was no change. The surgical site was confirmed by the patient and me.     Plan: The risks, benefits, expected outcome, and alternative to the recommended procedure have been discussed with the patient. Patient understands and wants to proceed with the procedure.     Electronically signed by Joshua Chua MD on 8/27/2024 at 8:10 AM

## 2024-08-27 NOTE — PROGRESS NOTES
Pt has been accepted by  cardiologist to be transferred and admitted to .    Actual transfer will happen when a bed opens up.

## 2024-08-27 NOTE — PROGRESS NOTES
Please refer to my Am note for details.    Attempted cath could not be completed due to vessel calcifications.  I discussed this case with Dr. Betancourt.    Pt requested a transfer to .     I called [facility] access line and initiated the request to transfer to  to be admitted under cardiology team for cardiac cath.

## 2024-08-27 NOTE — PROGRESS NOTES
Pt went down to cath lab for PCI without successful insertion. Pt requesting transfer to . Transfer initiated and pt departed [facility] at 1730. Pt and family grateful for care. BP improved throughout shift. Report called to  Main CVICU to Dalila KRISHNA.

## 2024-08-27 NOTE — PROGRESS NOTES
PROGRESS NOTE    Patient Name: Edwin Plata  Admit Date: 2024 12:10 PM  MR #: [id number]  : 1950    Attending Physician: Sue Benjamin MD  Reason for consult: CP    History of Presenting Illness:      Edwin Plata is a 73 y.o. male on hospital day 2 with a history of .   History Obtained From:  patient, electronic medical record    Admitted with syncopal event while having lunch w wife.  He became LH Diaphoretic Nauseated lading to LOC.  He was Placido 40 upon EMS Arrival.  Wife indicated that he normally does not maintain hydration.     He was Hypotensive and thus placed on Levophed and admitted to ICU.   We are asked to see pt due to CP.  he has experienced Mid sternal CP ( Pressure) that radiates to left chest.  He has had numerous such CP at home. Over recent 1-2 weeks he has taken multiple SL NTG with relief.     ECG During CP no acute changes SR 58.  HS Troponin 33>31>18.     He had 7 prior stents then subsequent CABG x4 2019 : Radial to LAD, LIMA to diagnoal coming off the ruvalcaba of the vein to OM,SVG to PDA .  No Furhter Cath since CABG.     He has h/o Lone AF during a PCI remotely.   He has LCEA .  PAD w remote LCFA and Profunda Stent. Was on on Xarelto 10 QD but since admission changed to 2.5 bid.  PAD dose. Last dose this AM.   Recent Echo EF 50% With Moderate AS with Mean Gr in 20s.     He is off Levo Currently.     24 Tele SR denies CP nor SOB.  He does endorse severe B/L Claudication.  Cath not dozen. His Access vessels are too calcified and unable to acquire access despite US guidance. Multiple attempts made by Dr. Chua.   History:      EKG:  Past Medical History:   Diagnosis Date    Acute MI (HCC)     Alcoholic (HCC)     previous very heavy drinker    CAD (coronary artery disease)     Chest pain 2012    [facility] without MI    Colon polyps     adenomatous    Diabetes mellitus, type II (HCC)     History of alcoholism (HCC) 2015    History of tobacco abuse 2015     performed without the administration of intravenous contrast. Automated exposure control, iterative reconstruction, and/or weight based adjustment of the mA/kV was utilized to reduce the radiation dose to as low as reasonably achievable. COMPARISON: None. HISTORY: ORDERING SYSTEM PROVIDED HISTORY: syncope TECHNOLOGIST PROVIDED HISTORY: Has a \"code stroke\" or \"stroke alert\" been called?->No Reason for exam:->syncope Decision Support Exception - unselect if not a suspected or confirmed emergency medical condition->Emergency Medical Condition (MA) What reading provider will be dictating this exam?->CRC FINDINGS: BRAIN/VENTRICLES: There is no acute intracranial hemorrhage, mass effect or midline shift.  No abnormal extra-axial fluid collection.  The gray-white differentiation is maintained without evidence of an acute infarct.  There is no evidence of hydrocephalus. ORBITS: The visualized portion of the orbits demonstrate no acute abnormality. SINUSES: The visualized paranasal sinuses and mastoid air cells demonstrate no acute abnormality. SOFT TISSUES/SKULL:  No acute abnormality of the visualized skull or soft tissues.     No acute intracranial abnormality.     XR CHEST PORTABLE    Result Date: 8/25/2024  EXAMINATION: ONE XRAY VIEW OF THE CHEST 8/25/2024 12:54 pm COMPARISON: None. HISTORY: ORDERING SYSTEM PROVIDED HISTORY: SYNCOPE TECHNOLOGIST PROVIDED HISTORY: Reason for exam:->SYNCOPE What reading provider will be dictating this exam?->CRC FINDINGS: The lungs are without acute focal process.  There is no effusion or pneumothorax. The cardiomediastinal silhouette is without acute process. The osseous structures are without acute process. Postoperative sternotomy changes are noted.     No acute process.     XR HIP GENERAL 3V PELV/AP/LAT RIGHT    Result Date: 8/5/2024  * * *Final Report* * * DATE OF EXAM: Jul 31 2024  1:10PM   SVX   5352  -  XR HIP 3V PELV+ AP/LAT RT  / ACCESSION #  [id number] PROCEDURE REASON: Lateral  pain of hip      * * * * Physician Interpretation * * * *  EXAMINATION / TECHNIQUE:  XR HIP 3V PELV+ AP/LAT RT PATIENT/TECHNOLOGIST PROVIDED HISTORY:   lateral right hip pain CLINICAL INFORMATION (AS PROVIDED BY ORDERING CLINICIAN) :  Lateral pain of hip COMPARISON: 05/09/2024 RESULT: Increased volume of calcium hydroxyapatite deposition along the right greater trochanter.  No acute fracture or dislocation.  Minimal osteophytic lipping along the right acetabulum and femoral head neck junction.  Mild left hip osteoarthritis.  Mild mechanical degenerative changes at the sacroiliac joints bilaterally.  Pubic symphysis is maintained.  Diffuse atherosclerotic vascular calcification.  Surgical clips project over the scrotum.    IMPRESSION: Increased calcium hydroxyapatite along the right gluteus medius/minimus insertions in keeping with calcific tendinosis. : EVERETTE   Transcribe Date/Time: Aug  5 2024 11:46A Dictated by : MATTHEW MALIK MD This examination was interpreted and the report reviewed and electronically signed by: MATTHEW MALIK MD on Aug  5 2024 11:49AM  EST      Active Hospital Problems    Diagnosis Date Noted    Syncope and collapse [R55] 08/26/2024     Priority: Low    Hypotension [I95.9] 08/25/2024     Priority: Low         Impression/Plan:   Syncope - most likely Vasodepressor. Keep On Tele. No pauses nor any malignant arrhythmia thus far.   LICA 50% by CTA  CP -likely represents accelerating angina - ASA add BB add Statin.  Pt will benefit form Cath Bypass study.  I suspect he has a graft that is in Jeopardy. Unable to cannulate for Cath. Recommend CTA ABD w Runoff.  Pt now considering transfer to . He will let us know. I will hold on CTA until decision made  HPL - Statin.   Moderate AS- continue out pt surveillance.  LVEF 50% in June 2024  LCEA s/p  PAD - Left Common Femoral Artery and Profunda Artery PCI      Thank you for allowing us to participate in the care of this patient.

## 2024-08-27 NOTE — DISCHARGE SUMMARY
Hospital Medicine Discharge Summary    Edwin Plata  :  1950  MRN:  [id number]    Admit date:  2024  Discharge date:  2024    Admitting Physician:  Betzaida Esquivel MD  Primary Care Physician:  Yao Garcia MD      Discharge Diagnoses:      *Acute kidney failure, improved with IV fluid.  Likely caused by hemodynamic shift and volume loss in the setting of patient taking lisinopril and Jardiance.  UA is bland.     *Hypotension.  Resolved.     *Intermittent chest pain.  Negative troponin.  EKG does not show ST elevation or depression.  Patient has a history of 5 stents followed by CABG in 2019.  Cardiac cath this morning could not be completed due to calcified vessels. Pt was transferred to  for a cardiac cath.     *Diabetes.  Resume metformin and Jardiance.  Add sliding scale     *Anemia, no evidence of acute blood loss.  May need investigation such as a GI investigation but could be done in the outpatient setting.     *Leukocytosis, afebrile, no evidence of infection at this time.  UA is negative.  Chest x-ray negative.  No skin infection.  Continue to monitor.  Requested blood culture.  Leukocytosis had resolved.  Blood cultures pending.     *Few other medical issues not listed above     Hospital Course:   Edwin Plata is a 73 y.o. male that was admitted and treated at [facility] for the Aforementioned medical issues.    Pt was transferred to  for needed on going medical and cardiac care.          Patient was seen by the following consultants while admitted to [facility]:   Consults:  IP CONSULT TO CARDIOLOGY    Significant Diagnostic Studies:    Vascular duplex carotid bilateral    Result Date: 2024  EXAMINATION: ULTRASOUND EVALUATION OF THE CAROTID ARTERIES 2024 TECHNIQUE: Duplex ultrasound using B-mode/gray scaled imaging, Doppler spectral analysis and color flow Doppler was obtained of the carotid arteries. COMPARISON: None. HISTORY: ORDERING  skull or soft tissues.     No acute intracranial abnormality.     XR CHEST PORTABLE    Result Date: 8/25/2024  EXAMINATION: ONE XRAY VIEW OF THE CHEST 8/25/2024 12:54 pm COMPARISON: None. HISTORY: ORDERING SYSTEM PROVIDED HISTORY: SYNCOPE TECHNOLOGIST PROVIDED HISTORY: Reason for exam:->SYNCOPE What reading provider will be dictating this exam?->CRC FINDINGS: The lungs are without acute focal process.  There is no effusion or pneumothorax. The cardiomediastinal silhouette is without acute process. The osseous structures are without acute process. Postoperative sternotomy changes are noted.     No acute process.       Discharge Medications:            Disposition:   Discharged to Home. Any [facility] needs that were indicated and/or required as been addressed and set up by Social Work.     Condition at discharge: Pt was medically stable at the time of discharge. Significant improvement in clinical condition compared to initial condition at presentation to hospital    Activity: activity as tolerated, fall precautions.     Total time taken for discharging this patient: 40 minutes. Greater than 70% of time was spent focused exclusively on this patient. Time was taken to review chart, discuss plans with consultants, reconciling medications, discussing plan answering questions with patient.     Signed:  Sue Benjamin MD  8/27/2024, 7:10 PM  ----------------------------------------------------------------------------------------------------------------------    Edwin PEARL Lay,     Please return to ER or call 911 if you develop any significant signs or symptoms.     I may not have addressed all of your medical illnesses or the abnormal blood work or imaging therefore please ask your PCP Yao Garcia MD and other out patient specialists and providers  to [facility] to follow up on all of the abnormal labs, imaging and findings that I have and have not addressed during your hospitalization.      Discharging you

## 2024-08-27 NOTE — PROGRESS NOTES
Pulmonary & Critical Care Medicine ICU Progress Note  Chief complaint : Chest pain     Subjunctive/24 hour events :   Patient seen and examined during multidisciplinary rounds with RN, charge nurse, RT, pharmacy, dietitian, and social service.   Patient had an uneventful night. He went for a heart cath today, but they were unable to obtain access for the cath D/T blockage in his femoral and radial vessels. He is on 3 liters nasal canula and O2 sats 97%. Levophed was able to weaned off yesterday. No fever overnight and urine output  2850 for 24 hours. Last BM 8/25/2024.       Social History     Tobacco Use    Smoking status: Former     Current packs/day: 1.00     Types: Cigarettes    Smokeless tobacco: Never   Substance Use Topics    Alcohol use: No     Comment: Sober for many years         Problem Relation Age of Onset    Arthritis Mother     Depression Mother     High Cholesterol Mother     Arthritis Father     Depression Father     Hearing Loss Father     High Blood Pressure Father     High Cholesterol Father     Other Father         COPD    Arthritis Sister     Hearing Loss Sister     High Blood Pressure Sister     High Cholesterol Sister     Other Sister         COPD    Arthritis Brother     Depression Brother     Hearing Loss Brother     High Blood Pressure Brother     High Cholesterol Brother     Other Brother         COPD       No results for input(s): \"PHART\", \"PNO7WSH\", \"PO2ART\" in the last 72 hours.    MV Settings:     / / /            IV:   sodium chloride      dextrose         Vitals:  BP (!) 124/36   Pulse 73   Temp 98.7 °F (37.1 °C) (Oral)   Resp 22   Ht 1.65 m (5' 4.96\")   Wt 81.6 kg (180 lb)   SpO2 97%   BMI 29.99 kg/m²    Tmax:       Intake/Output Summary (Last 24 hours) at 8/27/2024 1025  Last data filed at 8/27/2024 0629  Gross per 24 hour   Intake 965.95 ml   Output 2850 ml   Net -1884.05 ml       EXAM:    General: alert, cooperative  Head: normocephalic, atraumatic  Eyes:No gross  confirmed emergency medical condition->Emergency Medical Condition (MA) What reading provider will be dictating this exam?->CRC FINDINGS: BRAIN/VENTRICLES: There is no acute intracranial hemorrhage, mass effect or midline shift.  No abnormal extra-axial fluid collection.  The gray-white differentiation is maintained without evidence of an acute infarct.  There is no evidence of hydrocephalus. ORBITS: The visualized portion of the orbits demonstrate no acute abnormality. SINUSES: The visualized paranasal sinuses and mastoid air cells demonstrate no acute abnormality. SOFT TISSUES/SKULL:  No acute abnormality of the visualized skull or soft tissues.     No acute intracranial abnormality.     XR CHEST PORTABLE    Result Date: 8/25/2024  EXAMINATION: ONE XRAY VIEW OF THE CHEST 8/25/2024 12:54 pm COMPARISON: None. HISTORY: ORDERING SYSTEM PROVIDED HISTORY: SYNCOPE TECHNOLOGIST PROVIDED HISTORY: Reason for exam:->SYNCOPE What reading provider will be dictating this exam?->CRC FINDINGS: The lungs are without acute focal process.  There is no effusion or pneumothorax. The cardiomediastinal silhouette is without acute process. The osseous structures are without acute process. Postoperative sternotomy changes are noted.     No acute process.     XR HIP GENERAL 3V PELV/AP/LAT RIGHT    Result Date: 8/5/2024  * * *Final Report* * * DATE OF EXAM: Jul 31 2024  1:10PM   SVX   5352  -  XR HIP 3V PELV+ AP/LAT RT  / ACCESSION #  [id number] PROCEDURE REASON: Lateral pain of hip      * * * * Physician Interpretation * * * *  EXAMINATION / TECHNIQUE:  XR HIP 3V PELV+ AP/LAT RT PATIENT/TECHNOLOGIST PROVIDED HISTORY:   lateral right hip pain CLINICAL INFORMATION (AS PROVIDED BY ORDERING CLINICIAN) :  Lateral pain of hip COMPARISON: 05/09/2024 RESULT: Increased volume of calcium hydroxyapatite deposition along the right greater trochanter.  No acute fracture or dislocation.  Minimal osteophytic lipping along the right acetabulum and femoral  head neck junction.  Mild left hip osteoarthritis.  Mild mechanical degenerative changes at the sacroiliac joints bilaterally.  Pubic symphysis is maintained.  Diffuse atherosclerotic vascular calcification.  Surgical clips project over the scrotum.    IMPRESSION: Increased calcium hydroxyapatite along the right gluteus medius/minimus insertions in keeping with calcific tendinosis. : EVERETTE   Transcribe Date/Time: Aug  5 2024 11:46A Dictated by : MATTHEW MALIK MD This examination was interpreted and the report reviewed and electronically signed by: MATTHEW MALIK MD on Aug  5 2024 11:49AM  EST    Most recent    Chest CT      WITH CONTRAST:No results found for this or any previous visit.       WITHOUT CONTRAST: No results found for this or any previous visit.      CXR      2-view: No results found for this or any previous visit.       Portable: Results for orders placed during the hospital encounter of 08/25/24    XR CHEST PORTABLE    Narrative  EXAMINATION:  ONE XRAY VIEW OF THE CHEST    8/25/2024 12:54 pm    COMPARISON:  None.    HISTORY:  ORDERING SYSTEM PROVIDED HISTORY: SYNCOPE  TECHNOLOGIST PROVIDED HISTORY:  Reason for exam:->SYNCOPE  What reading provider will be dictating this exam?->CRC    FINDINGS:  The lungs are without acute focal process.  There is no effusion or  pneumothorax. The cardiomediastinal silhouette is without acute process. The  osseous structures are without acute process.    Postoperative sternotomy changes are noted.    Impression  No acute process.      Echo No results found for this or any previous visit.        Assessment:  This is a critically ill patient at risk of deterioration / death , needing close ICU monitoring and intervention due to below noted problems   Cardiogenic shock, resolved   Unstable angina, unable to perform heart cath today   PVD/claudication   DI, resolved   Elevated right hemidiaphragm, r/o paralyzed hemidiaphragm   KAYA   Diabetes   History

## 2024-08-28 ENCOUNTER — APPOINTMENT (OUTPATIENT)
Dept: CARDIOLOGY | Facility: HOSPITAL | Age: 74
End: 2024-08-28
Payer: MEDICARE

## 2024-08-28 PROBLEM — I21.4 NSTEMI (NON-ST ELEVATED MYOCARDIAL INFARCTION) (MULTI): Status: ACTIVE | Noted: 2024-08-27

## 2024-08-28 LAB
ACT BLD: 173 SEC (ref 83–199)
ALBUMIN SERPL BCP-MCNC: 3.8 G/DL (ref 3.4–5)
ALP SERPL-CCNC: 70 U/L (ref 33–136)
ALT SERPL W P-5'-P-CCNC: 12 U/L (ref 10–52)
ANION GAP SERPL CALC-SCNC: 22 MMOL/L (ref 10–20)
AST SERPL W P-5'-P-CCNC: 15 U/L (ref 9–39)
ATRIAL RATE: 56 BPM
ATRIAL RATE: 68 BPM
BASOPHILS # BLD AUTO: 0.11 X10*3/UL (ref 0–0.1)
BASOPHILS NFR BLD AUTO: 1.1 %
BILIRUB SERPL-MCNC: 0.5 MG/DL (ref 0–1.2)
BUN SERPL-MCNC: 14 MG/DL (ref 6–23)
CALCIUM SERPL-MCNC: 9 MG/DL (ref 8.6–10.6)
CARDIAC TROPONIN I PNL SERPL HS: 11 NG/L (ref 0–53)
CHLORIDE SERPL-SCNC: 99 MMOL/L (ref 98–107)
CO2 SERPL-SCNC: 24 MMOL/L (ref 21–32)
CREAT SERPL-MCNC: 0.78 MG/DL (ref 0.5–1.3)
EGFRCR SERPLBLD CKD-EPI 2021: >90 ML/MIN/1.73M*2
EOSINOPHIL # BLD AUTO: 0.76 X10*3/UL (ref 0–0.4)
EOSINOPHIL NFR BLD AUTO: 7.8 %
ERYTHROCYTE [DISTWIDTH] IN BLOOD BY AUTOMATED COUNT: 14.1 % (ref 11.5–14.5)
GLUCOSE BLD MANUAL STRIP-MCNC: 107 MG/DL (ref 74–99)
GLUCOSE BLD MANUAL STRIP-MCNC: 111 MG/DL (ref 74–99)
GLUCOSE BLD MANUAL STRIP-MCNC: 113 MG/DL (ref 74–99)
GLUCOSE SERPL-MCNC: 93 MG/DL (ref 74–99)
HCT VFR BLD AUTO: 38.7 % (ref 41–52)
HGB BLD-MCNC: 12.1 G/DL (ref 13.5–17.5)
IMM GRANULOCYTES # BLD AUTO: 0.05 X10*3/UL (ref 0–0.5)
IMM GRANULOCYTES NFR BLD AUTO: 0.5 % (ref 0–0.9)
LYMPHOCYTES # BLD AUTO: 2 X10*3/UL (ref 0.8–3)
LYMPHOCYTES NFR BLD AUTO: 20.4 %
MAGNESIUM SERPL-MCNC: 2.33 MG/DL (ref 1.6–2.4)
MCH RBC QN AUTO: 32.6 PG (ref 26–34)
MCHC RBC AUTO-ENTMCNC: 31.3 G/DL (ref 32–36)
MCV RBC AUTO: 104 FL (ref 80–100)
MONOCYTES # BLD AUTO: 0.85 X10*3/UL (ref 0.05–0.8)
MONOCYTES NFR BLD AUTO: 8.7 %
NEUTROPHILS # BLD AUTO: 6.01 X10*3/UL (ref 1.6–5.5)
NEUTROPHILS NFR BLD AUTO: 61.5 %
NRBC BLD-RTO: 0 /100 WBCS (ref 0–0)
P AXIS: 32 DEGREES
P AXIS: 57 DEGREES
P OFFSET: 174 MS
P OFFSET: 191 MS
P ONSET: 112 MS
P ONSET: 132 MS
PLATELET # BLD AUTO: 205 X10*3/UL (ref 150–450)
POTASSIUM SERPL-SCNC: 4.3 MMOL/L (ref 3.5–5.3)
PR INTERVAL: 192 MS
PR INTERVAL: 206 MS
PROT SERPL-MCNC: 5.9 G/DL (ref 6.4–8.2)
Q ONSET: 215 MS
Q ONSET: 228 MS
QRS COUNT: 11 BEATS
QRS COUNT: 9 BEATS
QRS DURATION: 92 MS
QRS DURATION: 94 MS
QT INTERVAL: 380 MS
QT INTERVAL: 432 MS
QTC CALCULATION(BAZETT): 404 MS
QTC CALCULATION(BAZETT): 416 MS
QTC FREDERICIA: 396 MS
QTC FREDERICIA: 422 MS
R AXIS: -32 DEGREES
R AXIS: -36 DEGREES
RBC # BLD AUTO: 3.71 X10*6/UL (ref 4.5–5.9)
SODIUM SERPL-SCNC: 141 MMOL/L (ref 136–145)
T AXIS: 110 DEGREES
T AXIS: 89 DEGREES
T OFFSET: 418 MS
T OFFSET: 431 MS
UFH PPP CHRO-ACNC: 0.2 IU/ML
UFH PPP CHRO-ACNC: 0.4 IU/ML
VENTRICULAR RATE: 56 BPM
VENTRICULAR RATE: 68 BPM
WBC # BLD AUTO: 9.8 X10*3/UL (ref 4.4–11.3)

## 2024-08-28 PROCEDURE — 2500000005 HC RX 250 GENERAL PHARMACY W/O HCPCS: Performed by: INTERNAL MEDICINE

## 2024-08-28 PROCEDURE — 97530 THERAPEUTIC ACTIVITIES: CPT | Mod: GP

## 2024-08-28 PROCEDURE — B2111ZZ FLUOROSCOPY OF MULTIPLE CORONARY ARTERIES USING LOW OSMOLAR CONTRAST: ICD-10-PCS | Performed by: INTERNAL MEDICINE

## 2024-08-28 PROCEDURE — 1100000001 HC PRIVATE ROOM DAILY

## 2024-08-28 PROCEDURE — 4A023N7 MEASUREMENT OF CARDIAC SAMPLING AND PRESSURE, LEFT HEART, PERCUTANEOUS APPROACH: ICD-10-PCS | Performed by: INTERNAL MEDICINE

## 2024-08-28 PROCEDURE — 85520 HEPARIN ASSAY: CPT

## 2024-08-28 PROCEDURE — 84484 ASSAY OF TROPONIN QUANT: CPT

## 2024-08-28 PROCEDURE — 85347 COAGULATION TIME ACTIVATED: CPT

## 2024-08-28 PROCEDURE — B2131ZZ FLUOROSCOPY OF MULTIPLE CORONARY ARTERY BYPASS GRAFTS USING LOW OSMOLAR CONTRAST: ICD-10-PCS | Performed by: INTERNAL MEDICINE

## 2024-08-28 PROCEDURE — 97161 PT EVAL LOW COMPLEX 20 MIN: CPT | Mod: GP

## 2024-08-28 PROCEDURE — 76937 US GUIDE VASCULAR ACCESS: CPT | Performed by: INTERNAL MEDICINE

## 2024-08-28 PROCEDURE — 93005 ELECTROCARDIOGRAM TRACING: CPT

## 2024-08-28 PROCEDURE — 2550000001 HC RX 255 CONTRASTS: Performed by: INTERNAL MEDICINE

## 2024-08-28 PROCEDURE — 93459 L HRT ART/GRFT ANGIO: CPT | Performed by: INTERNAL MEDICINE

## 2024-08-28 PROCEDURE — 2500000001 HC RX 250 WO HCPCS SELF ADMINISTERED DRUGS (ALT 637 FOR MEDICARE OP)

## 2024-08-28 PROCEDURE — 85025 COMPLETE CBC W/AUTO DIFF WBC: CPT

## 2024-08-28 PROCEDURE — 2720000007 HC OR 272 NO HCPCS: Performed by: INTERNAL MEDICINE

## 2024-08-28 PROCEDURE — 85347 COAGULATION TIME ACTIVATED: CPT | Performed by: INTERNAL MEDICINE

## 2024-08-28 PROCEDURE — 36415 COLL VENOUS BLD VENIPUNCTURE: CPT

## 2024-08-28 PROCEDURE — 97165 OT EVAL LOW COMPLEX 30 MIN: CPT | Mod: GO

## 2024-08-28 PROCEDURE — 99153 MOD SED SAME PHYS/QHP EA: CPT | Performed by: INTERNAL MEDICINE

## 2024-08-28 PROCEDURE — 99291 CRITICAL CARE FIRST HOUR: CPT

## 2024-08-28 PROCEDURE — 2500000005 HC RX 250 GENERAL PHARMACY W/O HCPCS: Performed by: STUDENT IN AN ORGANIZED HEALTH CARE EDUCATION/TRAINING PROGRAM

## 2024-08-28 PROCEDURE — 83735 ASSAY OF MAGNESIUM: CPT

## 2024-08-28 PROCEDURE — 82947 ASSAY GLUCOSE BLOOD QUANT: CPT

## 2024-08-28 PROCEDURE — 99152 MOD SED SAME PHYS/QHP 5/>YRS: CPT | Performed by: INTERNAL MEDICINE

## 2024-08-28 PROCEDURE — C1894 INTRO/SHEATH, NON-LASER: HCPCS | Performed by: INTERNAL MEDICINE

## 2024-08-28 PROCEDURE — 2500000004 HC RX 250 GENERAL PHARMACY W/ HCPCS (ALT 636 FOR OP/ED): Performed by: INTERNAL MEDICINE

## 2024-08-28 PROCEDURE — 2500000001 HC RX 250 WO HCPCS SELF ADMINISTERED DRUGS (ALT 637 FOR MEDICARE OP): Performed by: STUDENT IN AN ORGANIZED HEALTH CARE EDUCATION/TRAINING PROGRAM

## 2024-08-28 PROCEDURE — 80053 COMPREHEN METABOLIC PANEL: CPT

## 2024-08-28 PROCEDURE — C1769 GUIDE WIRE: HCPCS | Performed by: INTERNAL MEDICINE

## 2024-08-28 RX ORDER — LIDOCAINE 560 MG/1
1 PATCH PERCUTANEOUS; TOPICAL; TRANSDERMAL DAILY
Status: DISCONTINUED | OUTPATIENT
Start: 2024-08-29 | End: 2024-08-28

## 2024-08-28 RX ORDER — RIVAROXABAN 2.5 MG/1
2.5 TABLET, FILM COATED ORAL 2 TIMES DAILY
COMMUNITY
Start: 2024-05-01

## 2024-08-28 RX ORDER — FENTANYL CITRATE 50 UG/ML
INJECTION, SOLUTION INTRAMUSCULAR; INTRAVENOUS AS NEEDED
Status: DISCONTINUED | OUTPATIENT
Start: 2024-08-28 | End: 2024-08-28 | Stop reason: HOSPADM

## 2024-08-28 RX ORDER — METOPROLOL SUCCINATE 50 MG/1
TABLET, EXTENDED RELEASE ORAL
COMMUNITY
Start: 2023-06-12 | End: 2024-08-30 | Stop reason: HOSPADM

## 2024-08-28 RX ORDER — ACETAMINOPHEN 325 MG/1
975 TABLET ORAL EVERY 6 HOURS PRN
Status: DISCONTINUED | OUTPATIENT
Start: 2024-08-28 | End: 2024-08-30 | Stop reason: HOSPADM

## 2024-08-28 RX ORDER — SPIRONOLACTONE 25 MG/1
25 TABLET ORAL
COMMUNITY
Start: 2024-06-24 | End: 2024-08-30 | Stop reason: HOSPADM

## 2024-08-28 RX ORDER — LIDOCAINE 560 MG/1
1 PATCH PERCUTANEOUS; TOPICAL; TRANSDERMAL DAILY
Status: DISCONTINUED | OUTPATIENT
Start: 2024-08-28 | End: 2024-08-30 | Stop reason: HOSPADM

## 2024-08-28 RX ORDER — MIDAZOLAM HYDROCHLORIDE 1 MG/ML
INJECTION, SOLUTION INTRAMUSCULAR; INTRAVENOUS AS NEEDED
Status: DISCONTINUED | OUTPATIENT
Start: 2024-08-28 | End: 2024-08-28 | Stop reason: HOSPADM

## 2024-08-28 RX ORDER — TRAMADOL HYDROCHLORIDE 50 MG/1
50 TABLET ORAL EVERY 8 HOURS PRN
COMMUNITY
Start: 2024-08-13 | End: 2024-09-12

## 2024-08-28 RX ORDER — NAPROXEN SODIUM 220 MG/1
325 TABLET, FILM COATED ORAL ONCE
Status: COMPLETED | OUTPATIENT
Start: 2024-08-28 | End: 2024-08-28

## 2024-08-28 RX ORDER — TORSEMIDE 10 MG/1
10 TABLET ORAL DAILY
Status: ON HOLD | COMMUNITY
Start: 2024-02-26 | End: 2024-08-30

## 2024-08-28 RX ORDER — SACUBITRIL AND VALSARTAN 49; 51 MG/1; MG/1
1 TABLET, FILM COATED ORAL 2 TIMES DAILY
COMMUNITY
Start: 2023-10-26 | End: 2024-08-30 | Stop reason: HOSPADM

## 2024-08-28 RX ORDER — ATORVASTATIN CALCIUM 80 MG/1
80 TABLET, FILM COATED ORAL NIGHTLY
COMMUNITY

## 2024-08-28 RX ORDER — NAPROXEN SODIUM 220 MG/1
325 TABLET, FILM COATED ORAL ONCE
Status: DISCONTINUED | OUTPATIENT
Start: 2024-08-28 | End: 2024-08-28

## 2024-08-28 RX ORDER — NAPROXEN SODIUM 220 MG/1
81 TABLET, FILM COATED ORAL DAILY
Status: DISCONTINUED | OUTPATIENT
Start: 2024-08-29 | End: 2024-08-30 | Stop reason: HOSPADM

## 2024-08-28 RX ORDER — ASPIRIN 325 MG
TABLET ORAL
Status: COMPLETED
Start: 2024-08-28 | End: 2024-08-28

## 2024-08-28 RX ORDER — METOPROLOL TARTRATE 25 MG/1
12.5 TABLET, FILM COATED ORAL 2 TIMES DAILY
Status: DISCONTINUED | OUTPATIENT
Start: 2024-08-28 | End: 2024-08-29

## 2024-08-28 RX ORDER — SODIUM CHLORIDE 9 MG/ML
INJECTION, SOLUTION INTRAVENOUS CONTINUOUS PRN
Status: DISCONTINUED | OUTPATIENT
Start: 2024-08-28 | End: 2024-08-28 | Stop reason: HOSPADM

## 2024-08-28 RX ORDER — ASPIRIN 81 MG/1
1 TABLET ORAL DAILY
COMMUNITY

## 2024-08-28 RX ORDER — ISOSORBIDE MONONITRATE 60 MG/1
TABLET, EXTENDED RELEASE ORAL
COMMUNITY
Start: 2024-07-02 | End: 2024-08-30 | Stop reason: HOSPADM

## 2024-08-28 RX ORDER — LANOLIN ALCOHOL/MO/W.PET/CERES
1000 CREAM (GRAM) TOPICAL
COMMUNITY
Start: 2024-05-12

## 2024-08-28 RX ORDER — VALACYCLOVIR HYDROCHLORIDE 500 MG/1
1 TABLET, FILM COATED ORAL 2 TIMES DAILY
COMMUNITY
Start: 2024-08-06

## 2024-08-28 RX ORDER — LIDOCAINE HYDROCHLORIDE 10 MG/ML
INJECTION, SOLUTION EPIDURAL; INFILTRATION; INTRACAUDAL; PERINEURAL AS NEEDED
Status: DISCONTINUED | OUTPATIENT
Start: 2024-08-28 | End: 2024-08-28 | Stop reason: HOSPADM

## 2024-08-28 RX ORDER — METFORMIN HYDROCHLORIDE 500 MG/1
1000 TABLET ORAL
COMMUNITY
Start: 2024-04-09

## 2024-08-28 RX ORDER — NITROGLYCERIN 0.4 MG/1
TABLET SUBLINGUAL
COMMUNITY
Start: 2023-12-27

## 2024-08-28 SDOH — ECONOMIC STABILITY: TRANSPORTATION INSECURITY
IN THE PAST 12 MONTHS, HAS THE LACK OF TRANSPORTATION KEPT YOU FROM MEDICAL APPOINTMENTS OR FROM GETTING MEDICATIONS?: NO

## 2024-08-28 SDOH — ECONOMIC STABILITY: INCOME INSECURITY: IN THE LAST 12 MONTHS, WAS THERE A TIME WHEN YOU WERE NOT ABLE TO PAY THE MORTGAGE OR RENT ON TIME?: NO

## 2024-08-28 SDOH — ECONOMIC STABILITY: FOOD INSECURITY: WITHIN THE PAST 12 MONTHS, THE FOOD YOU BOUGHT JUST DIDN'T LAST AND YOU DIDN'T HAVE MONEY TO GET MORE.: NEVER TRUE

## 2024-08-28 SDOH — ECONOMIC STABILITY: INCOME INSECURITY: HOW HARD IS IT FOR YOU TO PAY FOR THE VERY BASICS LIKE FOOD, HOUSING, MEDICAL CARE, AND HEATING?: NOT VERY HARD

## 2024-08-28 SDOH — SOCIAL STABILITY: SOCIAL NETWORK: ARE YOU MARRIED, WIDOWED, DIVORCED, SEPARATED, NEVER MARRIED, OR LIVING WITH A PARTNER?: MARRIED

## 2024-08-28 SDOH — ECONOMIC STABILITY: INCOME INSECURITY: IN THE PAST 12 MONTHS, HAS THE ELECTRIC, GAS, OIL, OR WATER COMPANY THREATENED TO SHUT OFF SERVICE IN YOUR HOME?: NO

## 2024-08-28 SDOH — ECONOMIC STABILITY: HOUSING INSECURITY: IN THE PAST 12 MONTHS, HOW MANY TIMES HAVE YOU MOVED WHERE YOU WERE LIVING?: 0

## 2024-08-28 SDOH — ECONOMIC STABILITY: HOUSING INSECURITY: AT ANY TIME IN THE PAST 12 MONTHS, WERE YOU HOMELESS OR LIVING IN A SHELTER (INCLUDING NOW)?: NO

## 2024-08-28 SDOH — HEALTH STABILITY: MENTAL HEALTH
HOW OFTEN DO YOU NEED TO HAVE SOMEONE HELP YOU WHEN YOU READ INSTRUCTIONS, PAMPHLETS, OR OTHER WRITTEN MATERIAL FROM YOUR DOCTOR OR PHARMACY?: NEVER

## 2024-08-28 SDOH — ECONOMIC STABILITY: TRANSPORTATION INSECURITY
IN THE PAST 12 MONTHS, HAS LACK OF TRANSPORTATION KEPT YOU FROM MEETINGS, WORK, OR FROM GETTING THINGS NEEDED FOR DAILY LIVING?: NO

## 2024-08-28 SDOH — ECONOMIC STABILITY: FOOD INSECURITY: WITHIN THE PAST 12 MONTHS, YOU WORRIED THAT YOUR FOOD WOULD RUN OUT BEFORE YOU GOT MONEY TO BUY MORE.: NEVER TRUE

## 2024-08-28 ASSESSMENT — COGNITIVE AND FUNCTIONAL STATUS - GENERAL
STANDING UP FROM CHAIR USING ARMS: A LITTLE
MOVING FROM LYING ON BACK TO SITTING ON SIDE OF FLAT BED WITH BEDRAILS: A LITTLE
CLIMB 3 TO 5 STEPS WITH RAILING: A LITTLE
WALKING IN HOSPITAL ROOM: A LITTLE
MOBILITY SCORE: 18
MOVING TO AND FROM BED TO CHAIR: A LITTLE
DAILY ACTIVITIY SCORE: 22
DRESSING REGULAR LOWER BODY CLOTHING: A LITTLE
HELP NEEDED FOR BATHING: A LITTLE
TURNING FROM BACK TO SIDE WHILE IN FLAT BAD: A LITTLE

## 2024-08-28 ASSESSMENT — PAIN SCALES - GENERAL
PAINLEVEL_OUTOF10: 5 - MODERATE PAIN
PAINLEVEL_OUTOF10: 0 - NO PAIN
PAINLEVEL_OUTOF10: 2
PAINLEVEL_OUTOF10: 3
PAINLEVEL_OUTOF10: 5 - MODERATE PAIN
PAINLEVEL_OUTOF10: 0 - NO PAIN

## 2024-08-28 ASSESSMENT — PAIN - FUNCTIONAL ASSESSMENT
PAIN_FUNCTIONAL_ASSESSMENT: 0-10

## 2024-08-28 ASSESSMENT — ACTIVITIES OF DAILY LIVING (ADL)
ADL_ASSISTANCE: INDEPENDENT
BATHING_ASSISTANCE: STAND BY
ADL_ASSISTANCE: INDEPENDENT

## 2024-08-28 NOTE — H&P
CARDIAC ICU ADMISSION NOTE    History of Present Illness  Oliver Zhou is a 73 y.o. male with PMHx of HTN, HLD, CAD s/p multiple PCI with total 8 stents placed (2007, 2015) now s/p CABG x 4 2019, PAD with claudication s/p angioplasty 2022, DM2, HFrEF (EF 40%) 2/2 ischemic CM, GAURANG, carotid atery disease s/p L CEA (2022) who presented to [facility] after episode of syncope. Patient was found to have NSTEMI, admitted to  CICU after failed LHC attempt.     Patient states that he was out eating lunch when he became lightheaded, nauseous, and diaphoretic and then lost consciousness for reportedly almost a minute. EMS was called and pt was found to be bradycardic in the 40s. He was given a dose of atropine and epinephrine and his HR improved en route to ED. EKG upon arrival with fist degree AV block with HR of 70. Admission lab work significant for troponin 33 -> 31, Cr 1.26, Mg 1.3. Patient was noted to be hypotensive to 80s systolic in the ED that was not responsive to 3L of fluid, so patient was started on levophed and admitted to the MICU. Cardiology was consulted due intermittent midsternal chest pressure and NSTEMI, recommended obtaining LHC due to concern for CABG graft jeopardy, however were unsuccessful at cannulating LHC given severe vessel calcification. Levophed weaned off successfully 8/26. Decision was made to transfer patient to  for further cath evaluation.    Upon arrival to  CICU, patient's vitals HR 76, RR 24, /66, 94% on RA. Patient currently denies any chest pain, SOB, lightheadedness, dizziness, nausea, fevers, chills. Patient states that he has been having intermittent mid sternal chest pain/pressure for the past few months. He states that these episodes occur mostly with exertion, but intermittently at rest as well. He reports relief with SL NG at home. Patient endorses severe BL LE pain, and reports his mobility at home is limited by pain.     Labs:   RFP- 138/4.8/102/23/13/0.78  CBC-  WBC 10.8, Hg 11.9, Hcrt 38.3, Plts 218   Lactate 0.9     Troponin 15      CARDIAC DATA:  Cardiac cath (03/25/2019):  Diagnostic cardiac catheterization 3- with Dr Sosa   : LCx: severe proximal stenosis LAD: mild to moderate diffuse disease with severe disease at ostium of high diagonal RCA: severe proximal stenosis. PDA fills via collaterals.   S/P 3/29/2019: CABG x 4 (LIMA-Diag 1, Radial artery- LAD, VG-OM 1, VG-RCA) by Dr. Cohen 3-    Echocardiogram: IMGRESULT([id number])   Echocardiogram 08/26/2024    Narrative    Left Ventricle: Mildly reduced left ventricular systolic function with  a visually estimated EF of 45 - 50%. Left ventricle size is normal. Normal  wall thickness. Normal wall motion. Diastolic dysfunction present with  normal LV EF.    Aortic Valve: Moderately thickened cusps. Moderate sclerosis of the  aortic valve cusps. Mild to moderate regurgitation with a centrally  directed jet. Moderate stenosis of the aortic valve. AV mean gradient is  18 mmHg. AV area by continuity VTI is 1.1 cm2.    Mitral Valve: Mildly thickened leaflets.    Tricuspid Valve: Normal RVSP. The estimated RVSP is 30 mmHg.    Left Atrium: Left atrium is mildly dilated.    Image quality is adequate.    Left Ventricle  Mildly reduced left ventricular systolic function with a visually estimated EF of 45 - 50%. Left ventricle size is normal. Normal wall thickness. Normal wall motion. Diastolic dysfunction present with normal LV EF.    Right Ventricle  Right ventricle size is normal. Normal systolic function.    Left Atrium  Left atrium is mildly dilated.    Right Atrium  Right atrium size is normal.    IVC/SVC  IVC diameter is less than or equal to 21 mm and decreases greater than 50% during inspiration; therefore the estimated right atrial pressure is normal (~3 mmHg). IVC size is normal.    Mitral Valve  Mildly thickened leaflets. Trace regurgitation. No stenosis noted.    Tricuspid Valve  Valve  structure is normal. No regurgitation. No stenosis noted. Normal RVSP. The estimated RVSP is 30 mmHg.    Aortic Valve  Moderately thickened cusps. Moderate sclerosis of the aortic valve cusps. Mild to moderate regurgitation with a centrally directed jet. Moderate stenosis of the aortic valve. AV mean gradient is 18 mmHg. AV area by continuity VTI is 1.1 cm2.    Pulmonic Valve  The pulmonic valve visualization is suboptimal but appears to be functioning normally. Physiologically normal regurgitation. No stenosis noted.    Ascending Aorta  Normal sized aortic root and ascending aorta.    Pericardium  No pericardial effusion.    Study Details  Image quality: adequate. The view(s) performed were parasternal, apical and subcostal. Color flow Doppler was performed and pulse wave and/or continuous wave Doppler was performed. No contrast was given.    Carotid duplex 08/26/24:   The right internal carotid artery demonstrates 0-50% stenosis. The left internal carotid artery demonstrates 50-69% stenosis. The right vertebral artery is patent with flow in the normal direction. It is difficult to visualize flow within left vertebral artery.     CTA head and neck (08/26/24):  1. 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 2. Intermittent reconstitution of the V2 segment of the left vertebral artery superior to C6, with complete occlusion of the V3 segment. The inferior V4 segment of the left vertebral artery is completely occluded. There is retrograde flow of contrast from the basilar artery in the superior V4 segment to supply the left PICA. 3. 50% stenosis of the origin of the left common carotid artery from calcified plaque. 4. 10% stenosis of the proximal left internal carotid artery using NASCET criteria. 5. The right carotid system is widely patent.     Past Medical History  No past medical history on file.    Surgical History  No past surgical history on  "file.    Social History  He has no history on file for tobacco use, alcohol use, and drug use.    Family History  No family history on file.     Allergies  Patient has no known allergies.    Home Medications       Empagliflozin (JARDIANCE PO) Take by mouth       rivaroxaban (XARELTO) 10 MG TABS tablet Take 1 tablet by mouth       lisinopril (PRINIVIL;ZESTRIL) 10 MG tablet Take 1 tablet by mouth daily       metFORMIN (GLUCOPHAGE) 1000 MG tablet Take 1 tablet by mouth 2 times daily (with meals)  Qty: 60 tablet, Refills: 3       nitroGLYCERIN (NITROSTAT) 0.4 MG SL tablet Place 1 tablet under the tongue every 5 minutes as needed.  Qty: 25 tablet, Refills: 0     Associated Diagnoses: CAD (coronary artery disease)       aspirin EC 81 MG EC tablet Take 1 tablet by mouth daily       gemfibrozil (LOPID) 600 MG tablet Take 600 mg by mouth 2 times daily (before meals)       glimepiride (AMARYL) 1 MG tablet TAKE ONE TABLET BY MOUTH IN THE MORNING before breakfast  Qty: 90 tablet, Refills: 2       simvastatin (ZOCOR) 40 MG tablet Take 1 tablet by mouth nightly  Qty: 90 tablet, Refills: 3       clopidogrel (PLAVIX) 75 MG tablet Take 1 tablet by mouth daily.  Qty: 30 tablet, Refills: 6       benzonatate (TESSALON PERLES) 100 MG capsule Take 1 capsule by mouth every 6 hours as needed for Cough.  Qty: 30 capsule, Refills: 1     Associated Diagnoses: Cough; Wheezing       meloxicam (MOBIC) 15 MG tablet Take 1 tablet by mouth daily.  Qty: 30 tablet, Refills: 11       diltiazem (CARDIZEM CD) 240 MG ER capsule          Vitals:   BP (!) 118/48   Pulse 66   Temp 36.1 °C (97 °F) (Temporal)   Resp 21   Ht 1.626 m (5' 4\")   Wt 81.6 kg (180 lb)   SpO2 95%   BMI 30.90 kg/m²          8/27/2024    10:00 PM 8/27/2024    11:00 PM 8/28/2024    12:00 AM 8/28/2024     1:00 AM 8/28/2024     2:00 AM 8/28/2024     3:00 AM 8/28/2024     4:00 AM   Vitals   Systolic 120 115 128 126  113 118   Diastolic 103 55 52 49  54 48   Heart Rate 87 70 69 65 82 " "61 66   Temp   36.2 °C (97.2 °F)    36.1 °C (97 °F)   Resp 24 22 19 17 22 16 21     Labs:  Results from last 7 days   Lab Units 08/28/24  0422 08/27/24 2238   WBC AUTO x10*3/uL 9.8 10.8   HEMOGLOBIN g/dL 12.1* 11.9*   HEMATOCRIT % 38.7* 38.3*   PLATELETS AUTO x10*3/uL 205 218     Results from last 7 days   Lab Units 08/27/24 2238 08/27/24 2026   SODIUM mmol/L 138 138   POTASSIUM mmol/L 4.8 5.1   CO2 mmol/L 23 23   ANION GAP mmol/L 18 17   BUN mg/dL 13 15   CREATININE mg/dL 0.78 0.76   GLUCOSE mg/dL 138* 92   EGFR mL/min/1.73m*2 >90 >90   MAGNESIUM mg/dL  --  1.65   PHOSPHORUS mg/dL 2.9  --       Results from last 7 days   Lab Units 08/27/24 2026   ALT U/L 13   AST U/L 17   ALK PHOS U/L 74      Results from last 7 days   Lab Units 08/27/24 2238   INR  1.1             Results from last 7 days   Lab Units 08/27/24 2031   LACTATE mmol/L 0.9           Results from last 7 days   Lab Units 08/27/24 2026   TROPHSCMC ng/L 15       No results found for: \"CKTOTAL\", \"CKMB\", \"CKMBINDEX\", \"TROPONINI\"   Lab Results   Component Value Date    HGBA1C 7.1 (H) 08/26/2024      No results found for: \"CHOL\"  No results found for: \"HDL\"  No results found for: \"LDLCALC\"  No results found for: \"TRIG\"  No components found for: \"CHOLHDL\"     Imaging:  Cardiac procedure    Result Date: 8/27/2024  No coronary angiogram was performed since patient has severe peripheral artery disease Unable to obtain access via ultrasound due to severely calcified vessels Recommend a lower extremity angiogram to better delineate lower extremity arterial anatomy and to determine best access site Unable to perform this coronary angiogram via left radial artery since vessel was grafted for patient's CABG Bleeding Risk Calculator Bleeding Risk points = 0.    FL US guidance for vascular access    1.  Limited ultrasound images of the bilateral common femoral arteries were sent as part of vascular access. Images were submitted for interpretation. Grayscale and " color Doppler ultrasound of Limited view of common femoral arteries are seen. Not enough information is submitted for any diagnostic value. COMPARISON:No prior studies are available for comparison. DIAGNOSIS: Attempted arterial access in Cath Lab by cardiology COMMENTS: Syncope and collapse / Dr. Sanders aborted procedure after seeing vessels TECHNIQUE: Limited ultrasound of the left groin and right groin FINDINGS: Limited ultrasound of the bilateral groins and common femoral arteries are seen. Plaque is visualized on some images. Electronically signed by Thaddeus Lipscomb    CT angio neck    Result Date: 8/26/2024  EXAMINATION: CTA OF THE NECK 8/26/2024 12:22 pm TECHNIQUE: CTA of the neck was performed with the administration of intravenous contrast. Multiplanar reformatted images are provided for review.  MIP images are provided for review. Stenosis of the internal carotid arteries measured using NASCET criteria. Automated exposure control, iterative reconstruction, and/or weight based adjustment of the mA/kV was utilized to reduce the radiation dose to as low as reasonably achievable. COMPARISON: None. HISTORY: ORDERING SYSTEM PROVIDED HISTORY: Severe LICA Stenosis TECHNOLOGIST PROVIDED HISTORY: Reason for exam:->Severe LICA Stenosis Additional Contrast?->Radiologist Recommendation What reading provider will be dictating this exam?->CRC   FINDINGS: AORTIC ARCH/ARCH VESSELS: No dissection or arterial injury.  No significant stenosis of the brachiocephalic or subclavian arteries. CAROTID ARTERIES: Right carotid system: Normal. Left carotid system: 50% stenosis of the origin of the left common carotid artery from calcified plaque.  10% stenosis of the proximal left internal carotid artery using NASCET criteria.  The rest of the left carotid system is widely patent. 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  Heavy calcification of the  inferior cervical V2 segment of the left vertebral artery extending to the mid C6 level.  Intermittent reconstitution of the V2 segment of the left vertebral artery superior to C6, with complete occlusion of the V3 segment. The inferior V4 segment of the left retro artery is completely occluded. There is retrograde flow of contrast from the basilar artery in the superior V4 segment to supply the left PICA. The right vertebral artery is widely patent. SOFT TISSUES: The lung apices are clear.  No cervical or superior mediastinal lymphadenopathy.  The larynx and pharynx are unremarkable.  No acute abnormality of the salivary and thyroid glands.  BONES: No acute osseous abnormality.    1. 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 2. Intermittent reconstitution of the V2 segment of the left vertebral artery superior to C6, with complete occlusion of the V3 segment. The inferior V4 segment of the left vertebral artery is completely occluded. There is retrograde flow of contrast from the basilar artery in the superior V4 segment to supply the left PICA. 3. 50% stenosis of the origin of the left common carotid artery from calcified plaque. 4. 10% stenosis of the proximal left internal carotid artery using NASCET criteria. 5. The right carotid system is widely patent.    Echocardiogram    Result Date: 8/26/2024    Left Ventricle: Mildly reduced left ventricular systolic function with a visually estimated EF of 45 - 50%. Left ventricle size is normal. Normal wall thickness. Normal wall motion. Diastolic dysfunction present with normal LV EF.   Aortic Valve: Moderately thickened cusps. Moderate sclerosis of the aortic valve cusps. Mild to moderate regurgitation with a centrally directed jet. Moderate stenosis of the aortic valve. AV mean gradient is 18 mmHg. AV area by continuity VTI is 1.1 cm2.   Mitral Valve: Mildly thickened leaflets.   Tricuspid Valve: Normal  RVSP. The estimated RVSP is 30 mmHg.   Left Atrium: Left atrium is mildly dilated.   Image quality is adequate. Left Ventricle Mildly reduced left ventricular systolic function with a visually estimated EF of 45 - 50%. Left ventricle size is normal. Normal wall thickness. Normal wall motion. Diastolic dysfunction present with normal LV EF. Right Ventricle Right ventricle size is normal. Normal systolic function. Left Atrium Left atrium is mildly dilated. Right Atrium Right atrium size is normal. IVC/SVC IVC diameter is less than or equal to 21 mm and decreases greater than 50% during inspiration; therefore the estimated right atrial pressure is normal (~3 mmHg). IVC size is normal. Mitral Valve Mildly thickened leaflets. Trace regurgitation. No stenosis noted. Tricuspid Valve Valve structure is normal. No regurgitation. No stenosis noted. Normal RVSP. The estimated RVSP is 30 mmHg. Aortic Valve Moderately thickened cusps. Moderate sclerosis of the aortic valve cusps. Mild to moderate regurgitation with a centrally directed jet. Moderate stenosis of the aortic valve. AV mean gradient is 18 mmHg. AV area by continuity VTI is 1.1 cm2. Pulmonic Valve The pulmonic valve visualization is suboptimal but appears to be functioning normally. Physiologically normal regurgitation. No stenosis noted. Ascending Aorta Normal sized aortic root and ascending aorta. Pericardium No pericardial effusion. Study Details Image quality: adequate. The view(s) performed were parasternal, apical and subcostal. Color flow Doppler was performed and pulse wave and/or continuous wave Doppler was performed. No contrast was given.    Vascular duplex carotid bilateral    Result Date: 8/26/2024  EXAMINATION: ULTRASOUND EVALUATION OF THE CAROTID ARTERIES 8/26/2024 TECHNIQUE: Duplex ultrasound using B-mode/gray scaled imaging, Doppler spectral analysis and color flow Doppler was obtained of the carotid arteries. COMPARISON: None. HISTORY: ORDERING  SYSTEM PROVIDED HISTORY: Syncope FINDINGS: There is minimalplaque visualized in the right common carotid artery, internal carotid artery and bulb resulting in less than 50% stenosis.  The peak systolic and end-diastolic velocities in the right CCA, ICA and ECA are 125/18, 190/34 and 167cm/sec. The ICA/CCA ratio is 1.52.  The external carotid artery is patent with normal flow. Antegrade flow is present in the vertebral artery. There is minimalplaque visualized in the left common carotid artery, internal carotid artery and bulb resulting in less than 50% stenosis.  The peak systolic and end-diastolic velocities in the left CCA, ICA and ECA are 145/18, 381/66 and 163cm/sec. The ICA/CCA ratio is 2.63. The external carotid artery is patent with normal flow.  It is difficult to visualize flow within the left vertebral artery. Right Carotid Patient is s/p carotid endarterectomy. Common Carotid Artery: Patent. Minimal plaque. Internal Carotid Artery: Moderate (50-69%) stenosis. Intimal thickening present. Moderate plaque. External Carotid Artery: Patent. Vertebral Artery: Flow is antegrade. Left Carotid Patient is s/p carotid endarterectomy. Common Carotid Artery: Patent. Minimal plaque. Internal Carotid Artery: Severe (>=70%) stenosis. Intimal thickening present. Severe plaque. External Carotid Artery: Patent. Vertebral Artery: Not well visualized. ? Occluded Left vertebral artery Vascular Tech Details A gray scale, color Doppler imaging and spectral Doppler analysis ultrasound was performed. During the study longitudinal and transverse views were obtained. Pulsed wave doppler was performed. Overall the study quality was adequate. Study was technically difficult due to: acoustic shadowing. Bilateral CCA prx-mid difficult to visualize in gray scale    The right internal carotid artery demonstrates 0-50% stenosis. The left internal carotid artery demonstrates 50-69% stenosis. The right vertebral artery is patent with flow in  the normal direction.  It is difficult to visualize flow within left vertebral artery.      ED Interventions:  Medications   melatonin tablet 6 mg (6 mg oral Given 8/27/24 2253)   heparin 25,000 Units in dextrose 5% 250 mL (100 Units/mL) infusion (premix) (1,000 Units/hr intravenous New Bag 8/27/24 2338)   heparin bolus from bag 2,000-4,000 Units (has no administration in time range)   aspirin chewable tablet 81 mg (has no administration in time range)   atorvastatin (Lipitor) tablet 80 mg (80 mg oral Given 8/27/24 2336)   insulin lispro (HumaLOG) injection 0-5 Units (has no administration in time range)   magnesium sulfate 4 g in sterile water for injection 100 mL (0 g intravenous Stopped 8/28/24 0139)   heparin bolus from bag 4,000 Units (4,000 Units intravenous Bolus from Bag 8/27/24 2330)        Physical Exam   Constitutional: No acute distress, resting comfortably in bed, cooperative  HEENT: Normocephalic, atraumatic, PERRLA, EOMI, moist mucous membranes, no pharyngeal erythema  Cardiovascular: RRR, normal S1/S2, no murmurs noted  Pulmonary: Clear to auscultation b/l, no wheezes/crackles/rhonchi, no increased work of breathing, no supplemental oxygen  GI: Soft, non-tender, non-distended, normoactive bowel sounds  : No chance catheter  Lower extremities: Warm and well perfused, no lower extremity edema  Neuro: A&O x3, able to move all 4 extremities spontaneously, normal gait  Psych: Appropriate mood and affect     Medications  Scheduled medications  aspirin, 81 mg, oral, Daily  atorvastatin, 80 mg, oral, Nightly  insulin lispro, 0-5 Units, subcutaneous, TID        Continuous medications  heparin, 0-4,000 Units/hr, Last Rate: 1,000 Units/hr (08/27/24 2338)        PRN medications  PRN medications: heparin, melatonin      Assessment/Plan   Oliver Zhou is a 73 y.o. male with PMHx of HTN, HLD, CAD s/p multiple PCI with total 8 stents placed (2007, 2015) now s/p CABG x 4 2019, PAD with claudication s/p angioplasty  2022, DM2, HFrEF (EF 40%) 2/2 ischemic CM, GAURANG, carotid atery disease s/p L CEA (2022) who presented to [facility] after episode of syncope. Patient was found to have NSTEMI, admitted to  CICU after failed LHC attempt. Syncope likely related to initial bradycardia which has since resolved. Patient also briefly required levophed, but has been off since 8/26. Will continue patient on ASA, high intensity statin and heparin gtt. NPO for possible repeat LHC today.     Neuro:   MARTHA    CV:   #NSTEMI  #CAD s/p multiple PCI (2007, 2015) s/p CABG x 4 2019   #HLD  - Worsening intermittent chest pressure for past few months  - EKG with no acute changes, T wave inversions, ST segment changes  - Troponin at OSH 33 -> 31 -> 18  - Hx of 8 prior stents, CABG x4 2019 : Radial to LAD, LIMA to diagnoal coming off the stewart of the vein to OM,SVG to PDA. No further cath since CABG   - Unnable to cannulate at OSH due to severely calcified vessels  Plan:   - Continue ASA, atorvastatin 80 mg, heparin gtt   - NPO for LHC today   - If unable to pursue LHC, consider other coronary eval   - NG tablets PRN for chest pain  - Trops 15 -> 11 here  - No acute EKG changes, continue to monitor with serial EKGs     #HFrEF (45-50%)  #HTN  - Most recent ECHO done with EF 45-50%, moderate aortic stenosis    - Patient appears euvolemic on exam, low concern for acute exacerbation   -   - Hold home lisinopril and diltiazem iso initial hypotension and bradycardia  - Resume home GDMT as tolerated     #Carotid artery disease s/p L CEA (2022)   #PAD with severe claudication  - S/p Intravascular lithotripsy and angioplasty of left common femoral artery and profunda June 2022  - Holding home Xarelto (last dose 8/27 AM) iso possible intervention  - Continue patient on ASA and heparin gtt     Pulm:   MARTHA    GI:  MARTHA    Renal:  MARTHA    Endo:  #DM2  - Last HgA1c 7.1  - Home regiment: Jardiance 10 mg, metformmin 1000 mg BID  - Hold home meds, LDSSI     F :  PRN  E: PRN  N: NPO    DVT prophylaxis: on therapeutic AC w/ Heparin gtt    Code Status: Full Code (confirmed on admission)   NOK: Extended Emergency Contact Information  Primary Emergency Contact: Zena Zhou  Mobile Phone: [phone]  Relation: Spouse  Secondary Emergency Contact: Lisha Zhou  Mobile Phone: [phone]  Relation: Daughter      Ana Blanco MD  PGY-2 Internal Medicine

## 2024-08-28 NOTE — PROGRESS NOTES
Pharmacy Admission Order Reconciliation Review    Oliver Zhou is a 73 y.o. male admitted for Syncope and collapse. Pharmacy reviewed the patient's unreconciled admission medications.    Prior to admission medications that were reviewed and acted on by the pharmacist include:  Aspirin  Atorvastatin  Vitamin B12  Toprol XL  Metformin  Xarelto  Jardiance  These medications have been reconciled.     Any other unreconcilied medications have been addressed and will be ordered or held by the patient's medical team. Medications addressed by the pharmacist may be added or changed by the patient's medical team at any time.    Makeda Jose, PharmD  Transitions of Care Pharmacist  [facility]  Please reach out via Secure Chat for questions

## 2024-08-28 NOTE — PROGRESS NOTES
Pharmacy Medication History Review    Oliver Zhou is a 73 y.o. male admitted for Syncope and collapse. Pharmacy reviewed the patient's aavof-iq-[id number] medications and allergies for accuracy.    Medications ADDED:  ALL MEDICATIONS IN PTA  Medications CHANGED:  NONE  Medications REMOVED:   NONE      The list below reflects the updated PTA list.   Prior to Admission Medications   Prescriptions Last Dose Informant   Entresto 49-51 mg tablet  Self, Spouse/Significant Other   Sig: Take 1 tablet by mouth twice a day.   Xarelto 2.5 mg tablet  Self, Spouse/Significant Other   Sig: Take 1 tablet (2.5 mg) by mouth twice a day.   aspirin 81 mg EC tablet  Self, Spouse/Significant Other   Sig: Take 1 tablet (81 mg) by mouth once daily.   atorvastatin (Lipitor) 80 mg tablet  Self, Spouse/Significant Other   Sig: Take 1 tablet (80 mg) by mouth once daily at bedtime.   cyanocobalamin (Vitamin B-12) 1,000 mcg tablet  Self, Spouse/Significant Other   Sig: Take 1 tablet (1,000 mcg) by mouth once daily.   empagliflozin (Jardiance) 25 mg  Self, Spouse/Significant Other   Sig: Take 1 tablet (25 mg) by mouth once daily.   isosorbide mononitrate ER (Imdur) 60 mg 24 hr tablet  Self, Spouse/Significant Other   Sig: Take by mouth. Take 1 &1/2  (90 mg) tablets daily   metFORMIN (Glucophage) 500 mg tablet  Self, Spouse/Significant Other   Sig: Take 2 tablets (1,000 mg) by mouth 2 times daily (morning and late afternoon).   metoprolol succinate XL (Toprol-XL) 50 mg 24 hr tablet  Self, Spouse/Significant Other   Sig: Take two tablet in the morning, one tablet in the evening   nitroglycerin (Nitrostat) 0.4 mg SL tablet  Self, Spouse/Significant Other   Sig: Dissolve 1 tablet under the tongue as needed. FOR CHEST PAIN. IF NO RELIEF CALL 911.   spironolactone (Aldactone) 25 mg tablet  Self, Spouse/Significant Other   Sig: Take 1 tablet (25 mg) by mouth once daily.   torsemide (Demadex) 10 mg tablet  Self, Spouse/Significant Other   Sig: Take 1  tablet (10 mg) by mouth once daily.   traMADol (Ultram) 50 mg tablet  Self, Spouse/Significant Other   Sig: Take 1 tablet (50 mg) by mouth every 8 hours if needed.   valACYclovir (Valtrex) 500 mg tablet  Self, Spouse/Significant Other   Sig: Take 1 tablet (500 mg) by mouth 2 times a day.      Facility-Administered Medications: None        The list below reflects the updated allergy list. Please review each documented allergy for additional clarification and justification.  Allergies  Reviewed by Gonzales Gibbons RN on 8/27/2024   No Known Allergies         Patient declines M2B at discharge. Pharmacy has been updated to [facility].    Sources used to complete the med history include out patient fill history, OARRS, and patient interview with patient and wife Zena who were great historians with a complete medication list along with 8/25/24 admission ICU       Below are additional concerns with the patient's PTA list.  none    Marco Goldstein [facility]  Transitions of Care Clinical Pharmacist  Please reach out via Epic Chat for questions, if no response call  [phone] or [employer]  [facility]

## 2024-08-28 NOTE — PROGRESS NOTES
Social Work Progress Note   - ICU TREATMENT PLAN: Patient presented to [facility] after episode of syncope. Patient was found to have NSTEMI, admitted to  CICU after failed LHC attempt.  - Payer: [employer] Medicare  -Support System: Spouse Darlene and daughter Lisha are listed as NOK.   - Planned Disposition: Pending medical outcome and rehab recommendations  - Additional Information: SDOH and Social Work Discharge Planning assessments were completed with the patient and his spouse Darlene. There were no SDOH issues identified.  - Barriers to discharge: None at this time. SW will continue to follow.  ABDULLAHI RIOS

## 2024-08-28 NOTE — PROGRESS NOTES
"CARDIAC ICU PROGRESS NOTE    Oliver Zhou/[id number]    Admit Date: 8/27/2024  Hospital Length of Stay: 1   ICU Length of Stay: 17h     Subjective   OVN: The patient was transferred from [facility]    This morning, the patient says he is doing well. Only notes low back pain which he is used to. Denies chest pain, palpitations, sob, or lightheadedness. Discussed plan for [facility] today         Objective    VITALS:   Visit Vitals  /52   Pulse 71   Temp 35.5 °C (95.9 °F) (Temporal)   Resp 16   Ht 1.626 m (5' 4\")   Wt 84.1 kg (185 lb 6.5 oz)   SpO2 97%   BMI 31.83 kg/m²   BSA 1.95 m²       Vent settings:    Most Recent Range Past 24hrs   Mode      FiO2 32 % FiO2 (%)  Min: 32 %   Min taken time: 08/28/24 0400  Max: 32 %   Max taken time: 08/28/24 0400   Rate   No data recorded   Vt    No data recorded   PEEP   No data recorded     Invasive Hemodynamics:    Most Recent Range Past 24hrs   BP (Art)   No data recorded   MAP(Art)   No data recorded   RA/CVP   No data recorded   PA   No data recorded   PA(mean)   No data recorded   PCWP   No data recorded   CO   No data recorded   CI   No data recorded   Mixed Venous   No data recorded   SVR    No data recorded   PVR   No data recorded     Infusions:  heparin, Last Rate: 1,000 Units/hr (08/28/24 0400)        INTAKE/OUTPUT:  I/O last 3 completed shifts:  In: - (0 mL/kg)   Out: 1650 (19.6 mL/kg) [Urine:1650 (0.5 mL/kg/hr)]  Weight: 84.1 kg      Wt Readings from Last 5 Encounters:   08/28/24 84.1 kg (185 lb 6.5 oz)   08/27/24 81.6 kg (179 lb 14.3 oz)       LABS:  CBC:  Recent Labs     08/28/24 0422 08/27/24 2238   WBC 9.8 10.8   HGB 12.1* 11.9*   HCT 38.7* 38.3*    218   * 105*     CMP:  Recent Labs     08/28/24 0422 08/27/24 2238 08/27/24 2026    138 138   K 4.3 4.8 5.1   CL 99 102 103   CO2 24 23 23   ANIONGAP 22* 18 17   BUN 14 13 15   CREATININE 0.78 0.78 0.76   EGFR >90 >90 >90   MG 2.33  --  1.65     Recent Labs     08/28/24  0422 08/27/24  0871 " "08/27/24 2026   ALBUMIN 3.8 3.7 4.1   ALT 12  --  13   AST 15  --  17   BILITOT 0.5  --  0.6     COAG:   Recent Labs     08/27/24 2238   INR 1.1     ABO: No results for input(s): \"ABO\" in the last 50960 hours.  HEME/ENDO:  Recent Labs     08/26/24  0505 05/10/24  0535   HGBA1C 7.1* 7.5*      CARDIAC:   Recent Labs     08/28/24 0422 08/27/24 2026   TROPHS 11 15   BNP  --  166*     No results for input(s): \"LACMX\", \"LACTATEART\", \"SO2MV\", \"O2CMX\" in the last 76172 hours.    No lab exists for component: \"S\"  No results for input(s): \"TACROLIMUS\", \"SIROLIMUS\", \"CYCLOSPORINE\" in the last 25558 hours.    Results from last 7 days   Lab Units 08/28/24 0422 08/27/24 2238   WBC AUTO x10*3/uL 9.8 10.8   HEMOGLOBIN g/dL 12.1* 11.9*   HEMATOCRIT % 38.7* 38.3*   PLATELETS AUTO x10*3/uL 205 218     Results from last 7 days   Lab Units 08/28/24 0422 08/27/24 2238 08/27/24 2026   SODIUM mmol/L 141 138 138   POTASSIUM mmol/L 4.3 4.8 5.1   CO2 mmol/L 24 23 23   ANION GAP mmol/L 22* 18 17   BUN mg/dL 14 13 15   CREATININE mg/dL 0.78 0.78 0.76   GLUCOSE mg/dL 93 138* 92   EGFR mL/min/1.73m*2 >90 >90 >90   MAGNESIUM mg/dL 2.33  --  1.65   PHOSPHORUS mg/dL  --  2.9  --       Results from last 7 days   Lab Units 08/28/24 0422 08/27/24 2026   ALT U/L 12 13   AST U/L 15 17   ALK PHOS U/L 70 74      Results from last 7 days   Lab Units 08/27/24 2238   INR  1.1             Results from last 7 days   Lab Units 08/27/24 2031   LACTATE mmol/L 0.9           Results from last 7 days   Lab Units 08/28/24  0422 08/27/24 2026   TROPHSCMC ng/L 11 15       No results found for: \"CKTOTAL\", \"CKMB\", \"CKMBINDEX\", \"TROPONINI\"   Lab Results   Component Value Date    HGBA1C 7.1 (H) 08/26/2024      No results found for: \"CHOL\"  No results found for: \"HDL\"  No results found for: \"LDLCALC\"  No results found for: \"TRIG\"  No components found for: \"CHOLHDL\"     IMAGING:   Cardiac procedure    Result Date: 8/27/2024  No coronary angiogram was performed " since patient has severe peripheral artery disease Unable to obtain access via ultrasound due to severely calcified vessels Recommend a lower extremity angiogram to better delineate lower extremity arterial anatomy and to determine best access site Unable to perform this coronary angiogram via left radial artery since vessel was grafted for patient's CABG Bleeding Risk Calculator Bleeding Risk points = 0.    FL US guidance for vascular access    1.  Limited ultrasound images of the bilateral common femoral arteries were sent as part of vascular access. Images were submitted for interpretation. Grayscale and color Doppler ultrasound of Limited view of common femoral arteries are seen. Not enough information is submitted for any diagnostic value. COMPARISON:No prior studies are available for comparison. DIAGNOSIS: Attempted arterial access in Cath Lab by cardiology COMMENTS: Syncope and collapse / Dr. Sanders aborted procedure after seeing vessels TECHNIQUE: Limited ultrasound of the left groin and right groin FINDINGS: Limited ultrasound of the bilateral groins and common femoral arteries are seen. Plaque is visualized on some images. Electronically signed by Thaddeus Lipscomb    CT angio neck    Result Date: 8/26/2024  EXAMINATION: CTA OF THE NECK 8/26/2024 12:22 pm TECHNIQUE: CTA of the neck was performed with the administration of intravenous contrast. Multiplanar reformatted images are provided for review.  MIP images are provided for review. Stenosis of the internal carotid arteries measured using NASCET criteria. Automated exposure control, iterative reconstruction, and/or weight based adjustment of the mA/kV was utilized to reduce the radiation dose to as low as reasonably achievable. COMPARISON: None. HISTORY: ORDERING SYSTEM PROVIDED HISTORY: Severe LICA Stenosis TECHNOLOGIST PROVIDED HISTORY: Reason for exam:->Severe LICA Stenosis Additional Contrast?->Radiologist Recommendation What reading provider will be  dictating this exam?->CRC   FINDINGS: AORTIC ARCH/ARCH VESSELS: No dissection or arterial injury.  No significant stenosis of the brachiocephalic or subclavian arteries. CAROTID ARTERIES: Right carotid system: Normal. Left carotid system: 50% stenosis of the origin of the left common carotid artery from calcified plaque.  10% stenosis of the proximal left internal carotid artery using NASCET criteria.  The rest of the left carotid system is widely patent. 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  Heavy calcification of the inferior cervical V2 segment of the left vertebral artery extending to the mid C6 level.  Intermittent reconstitution of the V2 segment of the left vertebral artery superior to C6, with complete occlusion of the V3 segment. The inferior V4 segment of the left retro artery is completely occluded. There is retrograde flow of contrast from the basilar artery in the superior V4 segment to supply the left PICA. The right vertebral artery is widely patent. SOFT TISSUES: The lung apices are clear.  No cervical or superior mediastinal lymphadenopathy.  The larynx and pharynx are unremarkable.  No acute abnormality of the salivary and thyroid glands.  BONES: No acute osseous abnormality.    1. 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 2. Intermittent reconstitution of the V2 segment of the left vertebral artery superior to C6, with complete occlusion of the V3 segment. The inferior V4 segment of the left vertebral artery is completely occluded. There is retrograde flow of contrast from the basilar artery in the superior V4 segment to supply the left PICA. 3. 50% stenosis of the origin of the left common carotid artery from calcified plaque. 4. 10% stenosis of the proximal left internal carotid artery using NASCET criteria. 5. The right carotid system is  widely patent.      PHYSICAL EXAM:  Constitutional: No acute distress, cooperative   HEENT: No conjunctival icterus, EOMI grossly intact   Cardiovascular: RRR, normal S1/S2, no murmurs noted  Pulmonary: Clear to auscultation b/l, no wheezes/crackles/rhonchi, no increased work of breathing on 3L NC  GI: Soft, non-tender, non-distended, normoactive bowel sounds  Lower extremities: Warm and well perfused, no lower extremity edema. Pulses palpable  Neuro: A&O x3, able to move all 4 extremities spontaneously  Psych: Appropriate mood and affect       MEDICATIONS:   Scheduled medications  [START ON 8/29/2024] aspirin, 81 mg, oral, Daily  atorvastatin, 80 mg, oral, Nightly  insulin lispro, 0-5 Units, subcutaneous, TID  metoprolol tartrate, 12.5 mg, oral, BID        Continuous medications  heparin, 0-4,000 Units/hr, Last Rate: 1,000 Units/hr (08/28/24 0400)        PRN medications  PRN medications: heparin, melatonin          Assessment/Plan   Oliver Zhou is a 73 y.o. male with PMHx of HTN, HLD, CAD s/p multiple PCI with total 8 stents placed (2007, 2015) now s/p CABG x 4 2019, PAD with claudication s/p angioplasty 2022, DM2, HFrEF (EF 40%) 2/2 ischemic CM, GAURANG, carotid atery disease s/p L CEA (2022) who presented [facility] after episode of syncope. Patient was found to have NSTEMI, admitted to  CICU after failed LHC attempt. Syncope likely related to initial bradycardia which has since resolved. Patient also briefly required levophed, but has been off since 8/26. Will continue patient on ASA, high intensity statin and heparin gtt. Plan for LHC today.      Neuro:   MARTHA     CV:   #NSTEMI  #CAD s/p multiple PCI (2007, 2015) s/p CABG x 4 2019   #HLD  - Worsening intermittent chest pressure for past few months  - EKG with no acute changes, T wave inversions, ST segment changes  - Troponin at OSH 33 -> 31 -> 18  - Hx of 8 prior stents, CABG x4 2019 : Radial to LAD, LIMA to diagnoal coming off the stewart of the vein to OM,SVG  to PDA. No further cath since CABG   - Unnable to cannulate at OSH due to severely calcified vessels  Plan:   - Continue ASA, atorvastatin 80 mg, heparin gtt   - [facility] today; FU results  - NG tablets PRN for chest pain  - Trops 15 -> 11 here  - No acute EKG changes, continue to monitor with serial EKGs      #HFrEF (45-50%)  #HTN  - Most recent ECHO done with EF 45-50%, moderate aortic stenosis    - Patient appears euvolemic on exam, low concern for acute exacerbation   -   - Hold home lisinopril and diltiazem iso initial hypotension and bradycardia  - Started metop 12.5 mg BID  - Ordered complete echo; Unable to see previous one from outside hospital     #Carotid artery disease s/p L CEA (2022)   #PAD with severe claudication  - S/p Intravascular lithotripsy and angioplasty of left common femoral artery and profunda June 2022  - Holding home Xarelto (last dose 8/27 AM) iso possible intervention  - Continue patient on ASA and heparin gtt      Pulm:   MARTHA     GI:  MARTHA     Renal:  MARTHA     Endo:  #DM2  - Last HgA1c 7.1  - Home regiment: Jardiance 10 mg, metformmin 1000 mg BID  - Hold home meds, LDSSI      F : PRN  E: PRN  N: NPO     DVT prophylaxis: on therapeutic AC w/ Heparin gtt     Code Status: Full Code (confirmed on admission)   NOK: Extended Emergency Contact Information  Primary Emergency Contact: Zena Zhou  Mobile Phone: [phone]  Relation: Spouse  Secondary Emergency Contact: Servando Zhousy  Mobile Phone: [phone]  Relation: Daughter           Luis Enrique Evans MD  Internal Medicine, PGY-2

## 2024-08-28 NOTE — PROGRESS NOTES
Physical Therapy    Physical Therapy Evaluation & Treatment    Patient Name: Oliver Zhou  MRN: [id number]  Today's Date: 8/28/2024   Time Calculation  Start Time: 0850  Stop Time: 0913  Time Calculation (min): 23 min    Assessment/Plan   PT Assessment  PT Assessment Results: Decreased endurance, Impaired balance, Decreased mobility  Rehab Prognosis: Excellent  End of Session Communication: Bedside nurse  End of Session Patient Position: Up in chair, Alarm off, not on at start of session   IP OR SWING BED PT PLAN  Inpatient or Swing Bed: Inpatient  PT Plan  Treatment/Interventions: Bed mobility, Transfer training, Gait training, Stair training, Balance training, Strengthening, Endurance training, Therapeutic exercise, Therapeutic activity  PT Plan: Ongoing PT  PT Frequency: 2 times per week  PT Discharge Recommendations:  (outpatient cardiac rehab)  PT Recommended Transfer Status: Stand by assist  PT - OK to Discharge: Yes    Subjective     General Visit Information:  General  Reason for Referral: Syncope, NSTEMI; failed LHC attempt  Past Medical History Relevant to Rehab: HTN, HLD, CAD s/p multiple PCI with total 8 stents placed (2007, 2015) now s/p CABG x 4 2019, PAD with claudication s/p angioplasty 2022, DM2, HFrEF (EF 40%) 2/2 ischemic CM, GAURANG, carotid artery disease s/p L CEA (2022)  Family/Caregiver Present: No  Prior to Session Communication: Bedside nurse  Patient Position Received: Bed, 3 rail up, Alarm off, not on at start of session  General Comment: Pt pleasant and cooperative with therapy. On telemetry, heparin gtt, 2 L O2 NC.    Home Living:  Home Living  Type of Home: House  Lives With: Spouse (wife who is retired; pt also has other family/friends that live close that can help if needed)  Home Adaptive Equipment: Cane (Rollator)  Home Layout: Bed/bath upstairs, Stairs to alternate level with rails  Alternate Level Stairs-Rails:  (one handrail)  Alternate Level Stairs-Number of Steps: 15  Home Access:  Stairs to enter without rails  Entrance Stairs-Rails: None  Entrance Stairs-Number of Steps: 5    Prior Level of Function:  Prior Function Per Pt/Caregiver Report  Level of Grainger: Independent with ADLs and functional transfers  ADL Assistance: Independent  Homemaking Assistance: Independent  Ambulatory Assistance: Independent (Does not use an assistive device in the home, but uses cane for community ambulation. Limited <50 yards of ambulation 2/2 back and leg pain that increases with ambulation distance. No falls within the last 2 months (had one 3-4 months ago))  Leisure: (+) drives, likes to play pool but has not been able to do this lately    Precautions:  Precautions  Medical Precautions: Cardiac precautions, Fall precautions, Oxygen therapy device and L/min    Objective     Pain:  Pain Assessment  Pain Assessment: 0-10  0-10 (Numeric) Pain Score: 2  Pain Type:  (chronic low back pain- unchanged throughout session)    Cognition:  Cognition  Overall Cognitive Status: Within Functional Limits  Arousal/Alertness: Appropriate responses to stimuli  Orientation Level: Oriented X4  Following Commands: Follows all commands and directions without difficulty    General Assessments:   Activity Tolerance  Early Mobility/Exercise Safety Screen: Proceed with mobilization - No exclusion criteria met    Sensation  Light Touch:  (Numbness in B feet- chronic. Intact light touch sensation)    Strength  Strength Comments: B LE strength 4+/5 throughout  Strength  Strength Comments: B LE strength 4+/5 throughout    Coordination  Movements are Fluid and Coordinated: Yes    Static Sitting Balance  Static Sitting-Balance Support: No upper extremity supported, Feet supported  Static Sitting-Level of Assistance: Independent  Dynamic Sitting Balance  Dynamic Sitting-Balance Support: No upper extremity supported, Feet supported  Dynamic Sitting-Level of Assistance: Independent    Static Standing Balance  Static Standing-Balance  Support: No upper extremity supported  Static Standing-Level of Assistance: Contact guard  Dynamic Standing Balance  Dynamic Standing-Balance Support: No upper extremity supported  Dynamic Standing-Level of Assistance: Contact guard    Functional Assessments:  Bed Mobility  Bed Mobility: Yes  Bed Mobility 1  Bed Mobility 1: Supine to sitting  Level of Assistance 1: Minimum assistance (x1 person; cues for sequencing)  Bed Mobility Comments 1: HOB elevated; increased effort to stand    Transfers  Transfer: Yes  Transfer 1  Transfer From 1: Sit to  Transfer to 1: Stand  Technique 1: Sit to stand  Transfer Level of Assistance 1: Contact guard  Transfers 2  Transfer From 2: Stand to  Transfer to 2: Sit  Technique 2: Stand to sit  Transfer Level of Assistance 2: Contact guard    Ambulation/Gait Training  Ambulation/Gait Training Performed: Yes  Ambulation/Gait Training 1  Surface 1: Level tile  Device 1: No device  Assistance 1: Contact guard  Quality of Gait 1:  (decreased virginia, small step length. Pt initially with rigid gait pattern, but improved with increased amb distance)  Comments/Distance (ft) 1: 18 ft    Extremity/Trunk Assessments:  RLE   RLE : Within Functional Limits  LLE   LLE : Within Functional Limits    Treatments:  Therapeutic Activity  Therapeutic Activity Performed: Yes  Therapeutic Activity 1: Pt completed sit<->stand with no assistive device with supervision.  Therapeutic Activity 2: Pt ambulated 5 ft with no assistive device with CGA.    Outcome Measures:  [facility] Basic Mobility  Turning from your back to your side while in a flat bed without using bedrails: A little  Moving from lying on your back to sitting on the side of a flat bed without using bedrails: A little  Moving to and from bed to chair (including a wheelchair): A little  Standing up from a chair using your arms (e.g. wheelchair or bedside chair): A little  To walk in hospital room: A little  Climbing 3-5 steps with railing: A  yonathan  Basic Mobility - Total Score: 18    Confusion Assessment Method-ICU (CAM-ICU)  Feature 1: Acute Onset or Fluctuating Course: Negative  Overall CAM-ICU: Negative    FSS-ICU  Ambulation: Walks <50 feet with any assistance x1 or walks any distance with assistance x2 people  Rolling: Minimal assistance (performs 75% or more of task)  Sitting: Supervision or set-up only  Transfer Sit-to-Stand: Minimal assistance (performs 75% or more of task)  Transfer Supine-to-Sit: Minimal assistance (performs 75% or more of task)  Total Score: 18    Early Mobility/Exercise Safety Screen: Proceed with mobilization - No exclusion criteria met  ICU Mobility Scale: Walking with assistance of 1 person [8]  E = Exercise and Early Mobility  Early Mobility/Exercise Safety Screen: Proceed with mobilization - No exclusion criteria met  ICU Mobility Scale: Walking with assistance of 1 person    Encounter Problems       Encounter Problems (Active)       Balance       Pt will score >24 on Tinetti for low risk of falls (Progressing)       Start:  08/28/24    Expected End:  09/11/24               Mobility       Patient will ambulate >500 ft with LRAD and supervision (Progressing)       Start:  08/28/24    Expected End:  09/11/24            Patient will ascend and descend 5 stairs without handrail with LRAD with supervision (Progressing)       Start:  08/28/24    Expected End:  09/11/24               PT Transfers       Patient to transfer to and from sit to supine indep (Progressing)       Start:  08/28/24    Expected End:  09/11/24            Patient will transfer sit to and from stand indep (Progressing)       Start:  08/28/24    Expected End:  09/11/24               PT Transfers       Pt will complete 5XSTS from lowest bed height without UE assist <15 seconds (Progressing)       Start:  08/28/24    Expected End:  09/11/24                 Education Documentation  Mobility Training, taught by Freya Mckeon, PT at 8/28/2024 11:24  MARYJANE.  Learner: Patient  Readiness: Acceptance  Method: Explanation  Response: Verbalizes Understanding    Education Comments  No comments found.    Signed by Freya Mckeon DPT

## 2024-08-28 NOTE — POST-PROCEDURE NOTE
Physician Transition of Care Summary  Invasive Cardiovascular Lab    Procedure Date: 8/28/2024  Attending:    * Humberto Aiken - Primary  Resident/Fellow/Other Assistant: Surgeons and Role:  * No surgeons found with a matching role *    Indications:   Pre-op Diagnosis      * NSTEMI (non-ST elevated myocardial infarction) (Multi) [I21.4]    Post-procedure diagnosis:   Post-op Diagnosis     * NSTEMI (non-ST elevated myocardial infarction) (Multi) [I21.4]    Procedure(s):   Left Heart Cath  77[address] - [place] CATH PLMT L HRT & ARTS W/NJX & ANGIO IMG S&I      Procedure Findings:   Severe 3 vessel CAD  Atretic LIMA to D1  Patent vein graft to distal RCA  Patent radial artery to mid LAD  Patent vein graft to OM1    Description of the Procedure:   6F right femoral with manual pressure closure    Complications:   None    Stents/Implants:   Implants       No implant documentation for this case.          Anticoagulation/Antiplatelet Plan:   Per primary team    Estimated Blood Loss:   5 mL    Anesthesia: Moderate Sedation Anesthesia Staff: No anesthesia staff entered.    Any Specimen(s) Removed:   No specimens collected during this procedure.    Disposition:   Return back to CICU    Electronically signed by: Desmond Verde MD, 8/28/2024 5:17 PM

## 2024-08-28 NOTE — PROGRESS NOTES
Occupational Therapy    Evaluation    Patient Name: Oliver Zhou  MRN: [id number]  Today's Date: 8/28/2024  Room: [id number]/CM*  Time Calculation  Start Time: 1253  Stop Time: 1309  Time Calculation (min): 16 min    Assessment  IP OT Assessment  OT Assessment: Pt appears close to functional baseline from OT perspective. No skilled needs identified.  Prognosis: Excellent  Barriers to Discharge: None  Evaluation/Treatment Tolerance: Patient tolerated treatment well  Medical Staff Made Aware: Yes  End of Session Communication: Bedside nurse  End of Session Patient Position: Up in chair, Alarm off, not on at start of session  Plan:  Inpatient Plan  No Skilled OT: No acute OT goals identified  OT Frequency: OT eval only  OT Discharge Recommendations: No OT needed after discharge  OT Recommended Transfer Status: Stand by assist  OT - OK to Discharge: Yes  OT Assessment  Prognosis: Excellent  Barriers to Discharge: None  Evaluation/Treatment Tolerance: Patient tolerated treatment well  Medical Staff Made Aware: Yes    Subjective   Current Problem:  1. Syncope and collapse  Transthoracic Echo (TTE) Complete    Transthoracic Echo (TTE) Complete      2. NSTEMI (non-ST elevated myocardial infarction) (Multi)  Case Request Cath Lab: Left Heart Cath    Case Request Cath Lab: Left Heart Cath    Cardiac Catheterization Procedure    Cardiac Catheterization Procedure        General:  Reason for Referral: 74 y/o male admitted for Syncope, NSTEMI; failed LHC attempt  Past Medical History Relevant to Rehab: HTN, HLD, CAD s/p multiple PCI with total 8 stents placed (2007, 2015) now s/p CABG x 4 2019, PAD with claudication s/p angioplasty 2022, DM2, HFrEF (EF 40%) 2/2 ischemic CM, GAURANG, carotid artery disease s/p L CEA (2022)  Prior to Session Communication: Bedside nurse  Patient Position Received: Up in chair, Alarm off, not on at start of session  Family/Caregiver Present: Yes  Caregiver Feedback: Wife present during  session  General Comment: Pt pleasant and cooperative. Able to complete all tasks asked of him. (Heparin)   Precautions:  Medical Precautions: Cardiac precautions, Fall precautions, Oxygen therapy device and L/min (2LO2)  Vital Signs:  Vital Signs (Past 2hrs)        Date/Time Vitals Session Patient Position Pulse Resp SpO2 BP MAP (mmHg)    08/28/24 14:08:23 --  --  68  12  95 %  131/62  --     08/28/24 14:09:31 --  --  --  --  92 %  --  --     08/28/24 14:14:07 --  --  --  --  95 %  --  --     08/28/24 15:09:34 --  --  60  16  99 %  130/57  --                   Pain:  Pain Assessment  Pain Assessment: 0-10  0-10 (Numeric) Pain Score: 0 - No pain  Lines/Tubes/Drains:         Objective   Cognition:  Overall Cognitive Status: Within Functional Limits  Orientation Level: Oriented X4           Home Living:  Type of Home: House  Lives With: Spouse  Home Adaptive Equipment: Cane (Rollator)  Home Layout: Bed/bath upstairs, Stairs to alternate level with rails  Home Access: Stairs to enter without rails   Prior Function:  Level of Stitzer: Independent with ADLs and functional transfers, Independent with homemaking with ambulation  Receives Help From: Family  ADL Assistance: Independent  Homemaking Assistance: Independent  Ambulatory Assistance: Independent  Vocational: Retired ([employer])  Leisure: Enjoys playing pool  Prior Function Comments: (+)drives. Had a fall about 6months ago related to balance.  IADL History:     ADL:  Grooming Assistance: Independent  Grooming Deficit: Setup, Teeth care  Bathing Assistance: Stand by  UE Dressing Assistance: Independent  LE Dressing Assistance: Stand by  LE Dressing Deficit: Setup  Toileting Assistance with Device: Independent  Activity Tolerance:  Endurance: Endurance does not limit participation in activity  Early Mobility/Exercise Safety Screen: Proceed with mobilization - No exclusion criteria met  Balance:  Dynamic Standing Balance  Dynamic Standing-Level of Assistance:  Close supervision  Dynamic Standing-Comments: Standing at sink to manage oral hygiene  Static Sitting Balance  Static Sitting-Level of Assistance: Independent  Bed Mobility/Transfers: Bed Mobility  Bed Mobility: No  Functional Mobility  Functional Mobility Performed: Yes  Functional Mobility 1  Device 1: No device  Assistance 1: Close supervision  Comments 1: Pt ambulated through room and around obstacles. No LOB or instability.   and Transfers  Transfer: Yes  Transfer 1  Transfer From 1: Sit to  Transfer to 1: Stand, Chair with arms  Technique 1: Sit to stand, Stand to sit  Transfer Level of Assistance 1: Close supervision  Transfers 2  Transfer From 2: Sit to  Transfer to 2: Stand, Toilet  Technique 2: Sit to stand, Stand to sit  Transfer Level of Assistance 2: Close supervision  IADL's:      Vision:     and Vision - Complex Assessment  Ocular Range of Motion: Within Functional Limits  Sensation:  Light Touch: No apparent deficits  Strength:  Strength Comments: BUE strength WFL  Perception:  Inattention/Neglect: Appears intact  Coordination:  Movements are Fluid and Coordinated: Yes   Hand Function:  Hand Function  Gross Grasp: Functional  Coordination: Functional  Extremities:  ,  ,  , and      Outcome Measures: [facility] Daily Activity  Putting on and taking off regular lower body clothing: A little  Bathing (including washing, rinsing, drying): A little  Putting on and taking off regular upper body clothing: None  Toileting, which includes using toilet, bedpan or urinal: None  Taking care of personal grooming such as brushing teeth: None  Eating Meals: None  Daily Activity - Total Score: 22        ICU Mobility Screen  Early Mobility/Exercise Safety Screen: Proceed with mobilization - No exclusion criteria met,          Education Documentation  Body Mechanics, taught by Sabrina Sims OT at 8/28/2024  3:21 PM.  Learner: Patient  Readiness: Acceptance  Method: Explanation  Response: Verbalizes  Understanding    Precautions, taught by Sabrina Sims OT at 8/28/2024  3:21 PM.  Learner: Patient  Readiness: Acceptance  Method: Explanation  Response: Verbalizes Understanding    ADL Training, taught by Sabrina Sims OT at 8/28/2024  3:21 PM.  Learner: Patient  Readiness: Acceptance  Method: Explanation  Response: Verbalizes Understanding    Education Comments  No comments found.          08/28/24 at 3:22 PM   Sabrina Sims OT   Rehab Office: [phone]

## 2024-08-29 ENCOUNTER — APPOINTMENT (OUTPATIENT)
Dept: CARDIOLOGY | Facility: HOSPITAL | Age: 74
End: 2024-08-29
Payer: MEDICARE

## 2024-08-29 ENCOUNTER — PHARMACY VISIT (OUTPATIENT)
Dept: PHARMACY | Facility: CLINIC | Age: 74
End: 2024-08-29
Payer: MEDICARE

## 2024-08-29 LAB
ALBUMIN SERPL BCP-MCNC: 4 G/DL (ref 3.4–5)
ANION GAP SERPL CALC-SCNC: 16 MMOL/L (ref 10–20)
AORTIC VALVE MEAN GRADIENT: 24 MMHG
AORTIC VALVE PEAK VELOCITY: 3.37 M/S
AV PEAK GRADIENT: 45.4 MMHG
AVA (PEAK VEL): 1.18 CM2
AVA (VTI): 1.14 CM2
BASOPHILS # BLD AUTO: 0.08 X10*3/UL (ref 0–0.1)
BASOPHILS NFR BLD AUTO: 0.7 %
BUN SERPL-MCNC: 21 MG/DL (ref 6–23)
CALCIUM SERPL-MCNC: 9.6 MG/DL (ref 8.6–10.6)
CHLORIDE SERPL-SCNC: 102 MMOL/L (ref 98–107)
CO2 SERPL-SCNC: 26 MMOL/L (ref 21–32)
CREAT SERPL-MCNC: 0.9 MG/DL (ref 0.5–1.3)
EGFRCR SERPLBLD CKD-EPI 2021: 90 ML/MIN/1.73M*2
EJECTION FRACTION APICAL 4 CHAMBER: 39.9
EJECTION FRACTION: 48 %
EOSINOPHIL # BLD AUTO: 0.72 X10*3/UL (ref 0–0.4)
EOSINOPHIL NFR BLD AUTO: 6.7 %
ERYTHROCYTE [DISTWIDTH] IN BLOOD BY AUTOMATED COUNT: 14 % (ref 11.5–14.5)
GLUCOSE BLD MANUAL STRIP-MCNC: 100 MG/DL (ref 74–99)
GLUCOSE BLD MANUAL STRIP-MCNC: 113 MG/DL (ref 74–99)
GLUCOSE BLD MANUAL STRIP-MCNC: 141 MG/DL (ref 74–99)
GLUCOSE BLD MANUAL STRIP-MCNC: 142 MG/DL (ref 74–99)
GLUCOSE SERPL-MCNC: 114 MG/DL (ref 74–99)
HCT VFR BLD AUTO: 39.3 % (ref 41–52)
HGB BLD-MCNC: 12.5 G/DL (ref 13.5–17.5)
IMM GRANULOCYTES # BLD AUTO: 0.03 X10*3/UL (ref 0–0.5)
IMM GRANULOCYTES NFR BLD AUTO: 0.3 % (ref 0–0.9)
LEFT VENTRICLE INTERNAL DIMENSION DIASTOLE: 5 CM (ref 3.5–6)
LEFT VENTRICULAR OUTFLOW TRACT DIAMETER: 2.2 CM
LYMPHOCYTES # BLD AUTO: 1.5 X10*3/UL (ref 0.8–3)
LYMPHOCYTES NFR BLD AUTO: 14 %
MAGNESIUM SERPL-MCNC: 1.93 MG/DL (ref 1.6–2.4)
MCH RBC QN AUTO: 33.3 PG (ref 26–34)
MCHC RBC AUTO-ENTMCNC: 31.8 G/DL (ref 32–36)
MCV RBC AUTO: 105 FL (ref 80–100)
MITRAL VALVE E/A RATIO: 0.73
MONOCYTES # BLD AUTO: 1.05 X10*3/UL (ref 0.05–0.8)
MONOCYTES NFR BLD AUTO: 9.8 %
NEUTROPHILS # BLD AUTO: 7.36 X10*3/UL (ref 1.6–5.5)
NEUTROPHILS NFR BLD AUTO: 68.5 %
NRBC BLD-RTO: 0 /100 WBCS (ref 0–0)
PHOSPHATE SERPL-MCNC: 4.9 MG/DL (ref 2.5–4.9)
PLATELET # BLD AUTO: 194 X10*3/UL (ref 150–450)
POTASSIUM SERPL-SCNC: 4.6 MMOL/L (ref 3.5–5.3)
RBC # BLD AUTO: 3.75 X10*6/UL (ref 4.5–5.9)
RIGHT VENTRICLE FREE WALL PEAK S': 10.7 CM/S
SODIUM SERPL-SCNC: 139 MMOL/L (ref 136–145)
TRICUSPID ANNULAR PLANE SYSTOLIC EXCURSION: 1.5 CM
WBC # BLD AUTO: 10.7 X10*3/UL (ref 4.4–11.3)

## 2024-08-29 PROCEDURE — 82947 ASSAY GLUCOSE BLOOD QUANT: CPT

## 2024-08-29 PROCEDURE — 93306 TTE W/DOPPLER COMPLETE: CPT

## 2024-08-29 PROCEDURE — 2500000001 HC RX 250 WO HCPCS SELF ADMINISTERED DRUGS (ALT 637 FOR MEDICARE OP): Performed by: PHYSICIAN ASSISTANT

## 2024-08-29 PROCEDURE — 85025 COMPLETE CBC W/AUTO DIFF WBC: CPT

## 2024-08-29 PROCEDURE — RXMED WILLOW AMBULATORY MEDICATION CHARGE

## 2024-08-29 PROCEDURE — 2500000005 HC RX 250 GENERAL PHARMACY W/O HCPCS: Performed by: PHYSICIAN ASSISTANT

## 2024-08-29 PROCEDURE — 1100000001 HC PRIVATE ROOM DAILY

## 2024-08-29 PROCEDURE — 2500000001 HC RX 250 WO HCPCS SELF ADMINISTERED DRUGS (ALT 637 FOR MEDICARE OP)

## 2024-08-29 PROCEDURE — 37799 UNLISTED PX VASCULAR SURGERY: CPT

## 2024-08-29 PROCEDURE — 2500000004 HC RX 250 GENERAL PHARMACY W/ HCPCS (ALT 636 FOR OP/ED)

## 2024-08-29 PROCEDURE — 80069 RENAL FUNCTION PANEL: CPT

## 2024-08-29 PROCEDURE — 2500000001 HC RX 250 WO HCPCS SELF ADMINISTERED DRUGS (ALT 637 FOR MEDICARE OP): Performed by: STUDENT IN AN ORGANIZED HEALTH CARE EDUCATION/TRAINING PROGRAM

## 2024-08-29 PROCEDURE — 99291 CRITICAL CARE FIRST HOUR: CPT

## 2024-08-29 PROCEDURE — 97116 GAIT TRAINING THERAPY: CPT | Mod: GP

## 2024-08-29 PROCEDURE — 83735 ASSAY OF MAGNESIUM: CPT

## 2024-08-29 PROCEDURE — 97530 THERAPEUTIC ACTIVITIES: CPT | Mod: GP

## 2024-08-29 RX ORDER — METOPROLOL SUCCINATE 25 MG/1
25 TABLET, EXTENDED RELEASE ORAL DAILY
Qty: 30 TABLET | Refills: 0 | Status: SHIPPED | OUTPATIENT
Start: 2024-08-30

## 2024-08-29 RX ORDER — METOPROLOL SUCCINATE 25 MG/1
25 TABLET, EXTENDED RELEASE ORAL DAILY
Status: DISCONTINUED | OUTPATIENT
Start: 2024-08-29 | End: 2024-08-30 | Stop reason: HOSPADM

## 2024-08-29 RX ORDER — LISINOPRIL 10 MG/1
5 TABLET ORAL DAILY
Status: DISCONTINUED | OUTPATIENT
Start: 2024-08-29 | End: 2024-08-29

## 2024-08-29 RX ORDER — ISOSORBIDE MONONITRATE 30 MG/1
30 TABLET, EXTENDED RELEASE ORAL DAILY
Status: DISCONTINUED | OUTPATIENT
Start: 2024-08-29 | End: 2024-08-30 | Stop reason: HOSPADM

## 2024-08-29 RX ORDER — ISOSORBIDE MONONITRATE 30 MG/1
30 TABLET, EXTENDED RELEASE ORAL DAILY
Qty: 30 TABLET | Refills: 0 | Status: SHIPPED | OUTPATIENT
Start: 2024-08-30

## 2024-08-29 RX ORDER — DOCUSATE SODIUM 100 MG/1
100 CAPSULE, LIQUID FILLED ORAL 2 TIMES DAILY PRN
Status: DISCONTINUED | OUTPATIENT
Start: 2024-08-29 | End: 2024-08-30 | Stop reason: HOSPADM

## 2024-08-29 ASSESSMENT — PAIN - FUNCTIONAL ASSESSMENT
PAIN_FUNCTIONAL_ASSESSMENT: 0-10

## 2024-08-29 ASSESSMENT — COGNITIVE AND FUNCTIONAL STATUS - GENERAL
DAILY ACTIVITIY SCORE: 24
MOBILITY SCORE: 24
MOVING TO AND FROM BED TO CHAIR: A LITTLE
STANDING UP FROM CHAIR USING ARMS: A LITTLE
MOBILITY SCORE: 20
WALKING IN HOSPITAL ROOM: A LITTLE
CLIMB 3 TO 5 STEPS WITH RAILING: A LITTLE

## 2024-08-29 ASSESSMENT — PAIN SCALES - GENERAL
PAINLEVEL_OUTOF10: 0 - NO PAIN
PAINLEVEL_OUTOF10: 3
PAINLEVEL_OUTOF10: 0 - NO PAIN

## 2024-08-29 ASSESSMENT — ACTIVITIES OF DAILY LIVING (ADL): LACK_OF_TRANSPORTATION: NO

## 2024-08-29 ASSESSMENT — PAIN DESCRIPTION - LOCATION: LOCATION: BACK

## 2024-08-29 ASSESSMENT — PAIN DESCRIPTION - ORIENTATION: ORIENTATION: LOWER

## 2024-08-29 NOTE — PROGRESS NOTES
08/29/24 1458   Discharge Planning   Living Arrangements Spouse/significant other   Support Systems Spouse/significant other   Assistance Needed None   Type of Residence Private residence   Number of Stairs to Enter Residence 5   Number of Stairs Within Residence 14   Do you have animals or pets at home? No   Type of Animals or Pets None   Who is requesting discharge planning? Provider   Home or Post Acute Services None   Expected Discharge Disposition Home   Does the patient need discharge transport arranged? No   Financial Resource Strain   How hard is it for you to pay for the very basics like food, housing, medical care, and heating? Not very   Housing Stability   In the last 12 months, was there a time when you were not able to pay the mortgage or rent on time? N   In the past 12 months, how many times have you moved where you were living? 1   At any time in the past 12 months, were you homeless or living in a shelter (including now)? N   Transportation Needs   In the past 12 months, has lack of transportation kept you from medical appointments or from getting medications? no   In the past 12 months, has lack of transportation kept you from meetings, work, or from getting things needed for daily living? No   Patient Choice   Provider Choice list and CMS website (https://medicare.gov/care-compare#search) for post-acute Quality and Resource Measure Data were provided and reviewed with: Patient   Patient / Family choosing to utilize agency / facility established prior to hospitalization No     Transitional Care Coordinator Note:  8/29/2024@9:00 Met with patient to introduce myself, role and discuss discharge planning s/p admission.  Patient lives with wife Independent in all ADL's. Does  require assist devices for ambulation. Demographics and contact information confirmed.  Will continue to monitor patient for all home going needs.  Evan Cheney RN [facility] [phone]              Previous Home Care  None   DME  Cane /Lan   Pharmacy  Falls: none   O2/Cpap-None   Dialysis None   Transportation at discharge- Has ride home

## 2024-08-29 NOTE — PROGRESS NOTES
Physical Therapy    Physical Therapy Treatment    Patient Name: Oliver Zhou  MRN: [id number]  Today's Date: 8/29/2024  Time Calculation  Start Time: 1450  Stop Time: 1515  Time Calculation (min): 25 min         Assessment/Plan   PT Assessment  PT Assessment Results: Decreased strength, Decreased endurance  Rehab Prognosis: Good  Barriers to Discharge: None  Evaluation/Treatment Tolerance: Patient tolerated treatment well  Medical Staff Made Aware: Yes  End of Session Communication: Bedside nurse  Assessment Comment: Pt performed functional transfers, 5x STS test, Tinetti balance test, and ambulated 368' + 184' with CGA. Pt will continue to benefit from skilled PT to improve functional mobility and safety with stair ambulation.  End of Session Patient Position: Up in chair, Alarm off, not on at start of session  PT Plan  Inpatient/Swing Bed or Outpatient: Inpatient  PT Plan  Treatment/Interventions: Bed mobility, Transfer training, Gait training, Stair training, Balance training, Strengthening, Endurance training, Therapeutic exercise, Therapeutic activity  PT Plan: Ongoing PT  PT Frequency: 2 times per week  PT Discharge Recommendations: Low intensity level of continued care (OP cardiac rehab)  PT Recommended Transfer Status: Stand by assist  PT - OK to Discharge: Yes      General Visit Information:   PT  Visit  PT Received On: 08/29/24  Response to Previous Treatment: Patient with no complaints from previous session.  General  Prior to Session Communication: Bedside nurse  Patient Position Received: Up in chair, Alarm off, not on at start of session, Alarm off, caregiver present  Preferred Learning Style: auditory, verbal, visual  General Comment: Pt pleasant and willing to participate in PT treatment session. (Tele; per RN, OK to provide 1-2L O2 NC if patient's SpO2 decreased during ambulation however SpO2 >92% throughout session)    Subjective   Precautions:  Precautions  Medical Precautions: Cardiac precautions,  Fall precautions    Vital Signs (Past 2hrs)        Date/Time Vitals Session Patient Position Pulse Resp SpO2 BP MAP (mmHg)    08/29/24 1407 --  --  82  21  91 %  115/63  80     08/29/24 1450 Pre PT  --  90  --  93 %  --  --                         Objective   Pain:  Pain Assessment  Pain Assessment: 0-10  0-10 (Numeric) Pain Score: 0 - No pain  Cognition:  Cognition  Overall Cognitive Status: Within Functional Limits  Orientation Level: Oriented X4  Coordination:  Movements are Fluid and Coordinated: Yes  Postural Control:  Postural Control  Postural Control: Within Functional Limits  Static Standing Balance  Static Standing-Balance Support: No upper extremity supported  Static Standing-Level of Assistance: Close supervision  Extremity/Trunk Assessments:    RLE   RLE : Within Functional Limits  LLE   LLE : Within Functional Limits  Activity Tolerance:  Activity Tolerance  Endurance: Endurance does not limit participation in activity  Early Mobility/Exercise Safety Screen: Proceed with mobilization - No exclusion criteria met  Activity Tolerance Comments: Vitals remained stable throughout session  Treatments:  Therapeutic Exercise  Therapeutic Exercise Performed: Yes  Therapeutic Exercise Activity 1: Seated LAQ x 10 bilat  Therapeutic Exercise Activity 2: Seated hip flexion x 10 bilat    Therapeutic Activity  Therapeutic Activity Performed: Yes  Therapeutic Activity 1: Pt took 1 seated rest break between bouts of ambulation due to slight hip pain/ache  Therapeutic Activity 2: Pt performed 5xSTS test; refer to outcome measures section  Therapeutic Activity 3: 1 standing rest break during first bout of ambulation for ~15s to measure SpO2; CGA for safety    Bed Mobility  Bed Mobility: No    Ambulation/Gait Training  Ambulation/Gait Training Performed: Yes  Ambulation/Gait Training 1  Surface 1: Level tile  Device 1: No device  Assistance 1: Contact guard  Comments/Distance (ft) 1: 368'; CGA for safety; one standing rest  break at middle point to measure SpO2  Ambulation/Gait Training 2  Surface 2: Level tile  Device 2: No device  Assistance 2: Contact guard  Comments/Distance (ft) 2: 184'; CGA for safety  Transfers  Transfer: Yes  Transfer 1  Transfer From 1: Sit to, Stand to  Transfer to 1: Stand, Sit  Technique 1: Sit to stand, Stand to sit  Transfer Level of Assistance 1: Close supervision  Trials/Comments 1: x3 reps; SBA for safety    Outcome Measures:  [facility] Basic Mobility  Turning from your back to your side while in a flat bed without using bedrails: None  Moving from lying on your back to sitting on the side of a flat bed without using bedrails: None  Moving to and from bed to chair (including a wheelchair): A little  Standing up from a chair using your arms (e.g. wheelchair or bedside chair): A little  To walk in hospital room: A little  Climbing 3-5 steps with railing: A little  Basic Mobility - Total Score: 20    FSS-ICU  Ambulation: Walks >/ or equal to 150 feet with supervision  Rolling: Complete independence  Sitting: Complete independence  Transfer Sit-to-Stand: Supervision or set-up only  Transfer Supine-to-Sit: Supervision or set-up only  Total Score: 29      Early Mobility/Exercise Safety Screen: Proceed with mobilization - No exclusion criteria met  ICU Mobility Scale: Walking with assistance of 1 person [8]  Tinetti  Sitting Balance: Steady, safe  Arises: Able without using arms  Attempts to Arise: Able to arise, one attempt  Immediate Standing Balance (First 5 Seconds): Steady without walker or other support  Standing Balance: Narrow stance without support  Nudged: Staggers, grabs, catches self  Eyes Closed: Steady  Turned 360 Degrees: Steadiness: Steady  Turned 360 Degrees: Continuity of Steps: Continuous  Sitting Down: Safe, smooth motion  Balance Score: 15  Initiation of Gait: No hesitancy  Step Height: R Swing Foot: Right foot complete clears floor  Step Length: R Swing Foot: Passes left stance foot  Step  Height: L Swing Foot: Left foot complete clears floor  Step Length: L Swing Foot: Passes right stance foot  Step Symmetry: Right and left step appear equal  Step Continuity: Steps appear continuous  Path: Straight without walking aid  Trunk: No sway, no flexion, no use of arms, no walking aid  Walking Time: Heels almost touching while walking  Gait Score: 12  Total Score: 27    Other Measures  5x Sit to Stand: 15.11 (without use of UEs)    Education Documentation  Mobility Training, taught by Kaykay Varner, PT at 8/29/2024  3:38 PM.  Learner: Patient  Readiness: Acceptance  Method: Explanation  Response: Verbalizes Understanding    Education Comments  No comments found.             Encounter Problems       Encounter Problems (Active)       Balance       Pt will score >24 on Tinetti for low risk of falls (Progressing)       Start:  08/28/24    Expected End:  09/11/24               Mobility       Patient will ambulate >500 ft with LRAD and supervision (Progressing)       Start:  08/28/24    Expected End:  09/11/24            Patient will ascend and descend 5 stairs without handrail with LRAD with supervision (Progressing)       Start:  08/28/24    Expected End:  09/11/24               PT Transfers       Patient to transfer to and from sit to supine indep (Progressing)       Start:  08/28/24    Expected End:  09/11/24            Patient will transfer sit to and from stand indep (Progressing)       Start:  08/28/24    Expected End:  09/11/24               PT Transfers       Pt will complete 5XSTS from lowest bed height without UE assist <15 seconds (Progressing)       Start:  08/28/24    Expected End:  09/11/24               Pain - Adult            KAYKAY VARNER, PT

## 2024-08-29 NOTE — SIGNIFICANT EVENT
RADAR Note     08/29/24 1354   Onset Documentation   Rapid Response Initiated By Radar auto page   Location/Room [facility] [place]   Pager Time 1354   Arrival Time 1400   Event End Time 1408   Level II Called No   Primary Reason for Call Radar auto page     RADAR auto-generated page of 7 received by Rapid Response Team.  VS at time of RADAR were 36, 77, 28, 106/57, 97%.  Spoke with bedside RN, no acute changes in patient condition.  RN encouraged to page Rapid Response if concerns should arise in patient's status.

## 2024-08-29 NOTE — SIGNIFICANT EVENT
Patient safely transferred to the floor with all belongings in stable condition to the I Cardiology service. He was almost ready to be discharged from the CICU when he became hypotensive at 86/57 upon standing, so he was given an LR bolus and transferred to the medical floor for BP monitoring overnight. Upon evaluation, patient is HDS and in no acute distress with no active complaints. He is up and walking back to his chair from the restroom and denies dizziness, lightheadedness, chest pain or shortness of breath. He states that he is feeling great. Based on chart review and physical examination, patient is appropriate for the I service at this time. All questions answered at this time, plan of care as per daily progress note. To be seen and discussed with AM staff.     Dione Sun PA-C

## 2024-08-29 NOTE — PROGRESS NOTES
"CARDIAC ICU PROGRESS NOTE    Oliver Zhou/[id number]    Admit Date: 8/27/2024  Hospital Length of Stay: 2   ICU Length of Stay: 1d 17h     Subjective   OVN: No acute events. Patient had LHC yesterday with no interventions    This morning, the patient says he is doing well. Back pain has improved with lidocaine patch. Denies chest pain, sob, palpitations or lightheadedness         Objective    VITALS:   Visit Vitals  BP 86/57 (Patient Position: Standing)   Pulse 72   Temp 36 °C (96.8 °F) (Temporal)   Resp 24   Ht 1.626 m (5' 4\")   Wt 84.8 kg (186 lb 15.2 oz)   SpO2 90%   BMI 32.09 kg/m²   BSA 1.96 m²       Vent settings:    Most Recent Range Past 24hrs   Mode      FiO2 32 % No data recorded   Rate   No data recorded   Vt    No data recorded   PEEP   No data recorded     Invasive Hemodynamics:    Most Recent Range Past 24hrs   BP (Art)   No data recorded   MAP(Art)   No data recorded   RA/CVP   No data recorded   PA   No data recorded   PA(mean)   No data recorded   PCWP   No data recorded   CO   No data recorded   CI   No data recorded   Mixed Venous   No data recorded   SVR    No data recorded   PVR   No data recorded     Infusions:         INTAKE/OUTPUT:  I/O last 3 completed shifts:  In: - (0 mL/kg)   Out: 2855 (33.7 mL/kg) [Urine:2850 (0.9 mL/kg/hr); Blood:5]  Weight: 84.8 kg      Wt Readings from Last 5 Encounters:   08/29/24 84.8 kg (186 lb 15.2 oz)   08/27/24 81.6 kg (179 lb 14.3 oz)       LABS:  CBC:  Recent Labs     08/29/24  0543 08/28/24  0422 08/27/24 2238   WBC 10.7 9.8 10.8   HGB 12.5* 12.1* 11.9*   HCT 39.3* 38.7* 38.3*    205 218   * 104* 105*     CMP:  Recent Labs     08/29/24  0543 08/28/24  0422 08/27/24 2238 08/27/24 2026    141 138 138   K 4.6 4.3 4.8 5.1    99 102 103   CO2 26 24 23 23   ANIONGAP 16 22* 18 17   BUN 21 14 13 15   CREATININE 0.90 0.78 0.78 0.76   EGFR 90 >90 >90 >90   MG 1.93 2.33  --  1.65     Recent Labs     08/29/24  0543 08/28/24  0422 08/27/24  2238 " "08/27/24 2026   ALBUMIN 4.0 3.8 3.7 4.1   ALT  --  12  --  13   AST  --  15  --  17   BILITOT  --  0.5  --  0.6     COAG:   Recent Labs     08/27/24 2238   INR 1.1     ABO: No results for input(s): \"ABO\" in the last 31894 hours.  HEME/ENDO:  Recent Labs     08/26/24  0505 05/10/24  0535   HGBA1C 7.1* 7.5*      CARDIAC:   Recent Labs     08/28/24 0422 08/27/24 2026   TROPHS 11 15   BNP  --  166*     No results for input(s): \"LACMX\", \"LACTATEART\", \"SO2MV\", \"O2CMX\" in the last 53861 hours.    No lab exists for component: \"S\"  No results for input(s): \"TACROLIMUS\", \"SIROLIMUS\", \"CYCLOSPORINE\" in the last 33024 hours.    Results from last 7 days   Lab Units 08/29/24 0543 08/28/24 0422 08/27/24 2238   WBC AUTO x10*3/uL 10.7 9.8 10.8   HEMOGLOBIN g/dL 12.5* 12.1* 11.9*   HEMATOCRIT % 39.3* 38.7* 38.3*   PLATELETS AUTO x10*3/uL 194 205 218     Results from last 7 days   Lab Units 08/29/24  0543 08/28/24 0422 08/27/24 2238 08/27/24 2026   SODIUM mmol/L 139 141 138 138   POTASSIUM mmol/L 4.6 4.3 4.8 5.1   CO2 mmol/L 26 24 23 23   ANION GAP mmol/L 16 22* 18 17   BUN mg/dL 21 14 13 15   CREATININE mg/dL 0.90 0.78 0.78 0.76   GLUCOSE mg/dL 114* 93 138* 92   EGFR mL/min/1.73m*2 90 >90 >90 >90   MAGNESIUM mg/dL 1.93 2.33  --  1.65   PHOSPHORUS mg/dL 4.9  --  2.9  --       Results from last 7 days   Lab Units 08/28/24 0422 08/27/24 2026   ALT U/L 12 13   AST U/L 15 17   ALK PHOS U/L 70 74      Results from last 7 days   Lab Units 08/27/24  2238   INR  1.1             Results from last 7 days   Lab Units 08/27/24 2031   LACTATE mmol/L 0.9           Results from last 7 days   Lab Units 08/28/24  0422 08/27/24 2026   TROPHSCMC ng/L 11 15       No results found for: \"CKTOTAL\", \"CKMB\", \"CKMBINDEX\", \"TROPONINI\"   Lab Results   Component Value Date    HGBA1C 7.1 (H) 08/26/2024      No results found for: \"CHOL\"  No results found for: \"HDL\"  No results found for: \"LDLCALC\"  No results found for: \"TRIG\"  No components found " "for: \"CHOLHDL\"     IMAGING:   Cardiac Catheterization Procedure    Result Date: 8/29/2024   [facility], Cath Lab, [id number] [place] [address] Cardiovascular Catheterization Report Patient Name:      TEDDY ARANA           Performing Physician:  [id number] Aiken MD Study Date:        8/28/2024            Verifying Physician:   Eva Aiken MD MRN/PID:           [id number]             Cardiologist/Co-Scrub: Accession#:        [id number]         Ordering Provider:     [id number] CHICHO PATITO                                                                GILLOMBARDO Date of Birth/Age: 1950 / 73 years Cardiologist: Gender:            M                    Fellow:                [id number] Desmond Verde MD Admit Date:                             Fellow:                [id number] Davida De Santiago MD Encounter#:        [id number]           Surgeon:  Study: Left Heart Cath with Grafts  Indications: TEDDY ARANA is a 74 year old male who presents with hypertension, dyslipidemia, coronary artery disease, prior percutaneous coronary intervention, prior coronary artery bypass graft surgery, peripheral artery disease, diabetes and a chest pain assessment of atypical angina. Medical History: Cardiac arrest: No. Cardiac surgical consult: No. Cardiovascular instability: No.  Procedure Description: After infiltration with 2% Lidocaine, the right femoral artery was cannulated with a modified Seldinger technique. Subsequently a 6 Luxembourgish sheath was placed antegrade in the right femoral artery. Selective coronary catheterization was performed using a 5 Fr catheter(s) exchanged over a guide wire to cannulate the coronary arteries. A JL 3.5 tip catheter was used for left coronary " injections. A JR 4 tip catheter was used for right coronary injections. After completion of the procedure, the arterial sheath was pulled and pressure was applied to the site.  Coronary Angiography: The coronary circulation is right dominant.  Left Main Coronary Artery: The distal left main coronary artery showed 70% stenosis.  Left Anterior Descending Coronary Artery Distribution: The ostial left anterior descending coronary artery showed 100% stenosis.  Circumflex Coronary Artery Distribution: The ostial circumflex coronary artery showed 100% stenosis.  Ramus Intermedius: The ramus intermedius is a small caliber vessel. The ramus intermedius arises normally from the left main coronary artery. The ramus intermedius showed no significant disease or stenosis greater than 30%.  Right Coronary Artery Distribution: The proximal right coronary artery showed 100% stenosis.  Coronary Grafts:  LIMA Graft: The left internal mammary artery (LIMA) graft atretic. Saphaneous Vein Graft(s): The saphenous vein graft, originating from the aorta and attached to the 1st obtuse marginal, is widely patent. The 2nd saphenous vein graft, originating from the aorta and attached to the distal right coronary artery, is widely patent. Radial Graft: The radial graft, originating from the aorta and attached to the mid left anterior descending, is widely patent.  Coronary Lesion Summary: Vessel       Stenosis    Vessel Segment Left Main  70% stenosis      distal LAD        100% stenosis     ostial Circumflex 100% stenosis     ostial RCA        100% stenosis    proximal  Graft Stenosis Summary: Graft      Destination of Graft LIMA   1st diagonal SVG 1  1st obtuse marginal SVG 2  distal right coronary artery Radial mid left anterior descending  Hemo Personnel: +---------------+---------+ Name           Duty      +---------------+---------+ Humberto Aiken MD 1 +---------------+---------+  Fluoroscopy Time:  +--------------------------+---------+ X-Ray Summary Fluoro Time:17.30 min +--------------------------+---------+  +----------+---------+ Contrast: Dose:     +----------+---------+ Omnipaque:120.00 ml +----------+---------+  Hemodynamic Pressures:  +----+-------------------+---------+------------+-------------+------+---------+ Site     Date Time       Phase    Systolic    Diastolic    ED  Mean mmHg                          Name       mmHg        mmHg      mmHg           +----+-------------------+---------+------------+-------------+------+---------+   REVA  8/28/2024 2:33:59     Rest         139           53             81                      PM                                                  +----+-------------------+---------+------------+-------------+------+---------+   AO  8/28/2024 2:37:18     Rest         107           53             75                      PM                                                  +----+-------------------+---------+------------+-------------+------+---------+   LV  8/28/2024 2:38:56     Rest         126           15    19                               PM                                                  +----+-------------------+---------+------------+-------------+------+---------+   LV  8/28/2024 2:39:04     Rest         127           15    19                               PM                                                  +----+-------------------+---------+------------+-------------+------+---------+  LVp  8/28/2024 2:39:08     Rest         130           16    20                               PM                                                  +----+-------------------+---------+------------+-------------+------+---------+  AOp  8/28/2024 2:39:15     Rest         113           55             79                      PM                                                   +----+-------------------+---------+------------+-------------+------+---------+   AO  8/28/2024 2:57:43     Rest         126           47             78                      PM                                                  +----+-------------------+---------+------------+-------------+------+---------+  Complications: No in-lab complications observed.  Cardiac Cath Post Procedure Notes: Post Procedure Diagnosis: Severe 3 vessel CAD                           Atretic LIMA to D1                           Patent vein graft to distal RCA                           Patent radial artery to mid LAD                           Patent vein graft to OM1.  Blood Loss:               Estimated blood loss during the procedure was 5 mL                           mls. Specimens Removed:        Number of specimen(s) removed: none.  Recommendations: Maximize medical therapy. Agressive risk factor modification efforts. Medical management of coronary artery disease. ____________________________________________________________________________________ CONCLUSIONS:  1. Severe 3V CAD.  2. Atretic LIMA to D1.  3. Patent radial artery to mid LAD.  4. Patent vein graft to OM1.  5. Patent veing graft to distal RCA. ICD 10 Codes: Non ST elevation (NSTEMI) myocardial infarction-I21.4  CPT Codes: Left Heart Cath Bypass Graft w ventriculography and coronary angio(C)-70396; Moderate Sedation Services initial 15 minutes patient >5 years-26326; Moderate Sedation Services 1st additional 15 minutes patient >5 years-32038; Moderate Sedation Services 2nd additional 15 minutes patient >5 years-99153  [id number] Humberto Aiken MD Performing Physician Electronically signed by [id number] Humberto Aiken MD on 8/29/2024 at 7:40:22 AM  ** Final **     Electrocardiogram, 12-lead PRN ACS symptoms    Result Date: 8/28/2024  Normal sinus rhythm Left axis deviation Low voltage QRS Inferior infarct , age undetermined Cannot rule out Anterior infarct , age  undetermined Abnormal ECG No previous ECGs available    Electrocardiogram, 12-lead PRN ACS symptoms    Result Date: 8/28/2024  Sinus bradycardia Left axis deviation Low voltage QRS Inferior infarct (cited on or before 27-AUG-2024) Cannot rule out Anterior infarct (cited on or before 27-AUG-2024) Abnormal ECG When compared with ECG of 27-AUG-2024 18:45, No significant change was found      PHYSICAL EXAM:  Constitutional: No acute distress, cooperative   HEENT: No conjunctival icterus, EOMI grossly intact   Cardiovascular: RRR, normal S1/S2, no murmurs noted  Pulmonary: Clear to auscultation b/l, no wheezes/crackles/rhonchi, no increased work of breathing on RA  GI: Soft, non-tender, non-distended, normoactive bowel sounds  Lower extremities: Warm and well perfused, no lower extremity edema. Pulses palpable  Neuro: A&O x3, able to move all 4 extremities spontaneously  Psych: Appropriate mood and affect       MEDICATIONS:   Scheduled medications  aspirin, 81 mg, oral, Daily  atorvastatin, 80 mg, oral, Nightly  empagliflozin, 25 mg, oral, Daily  insulin lispro, 0-5 Units, subcutaneous, TID  isosorbide mononitrate ER, 30 mg, oral, Daily  lactated Ringer's, 1,000 mL, intravenous, Once  lidocaine, 1 patch, transdermal, Daily  metoprolol succinate XL, 25 mg, oral, Daily  rivaroxaban, 2.5 mg, oral, BID  sacubitriL-valsartan, 1 tablet, oral, BID        Continuous medications         PRN medications  PRN medications: acetaminophen, melatonin          Assessment/Plan   Oliver Zhou is a 73 y.o. male with PMHx of HTN, HLD, CAD s/p multiple PCI with total 8 stents placed (2007, 2015) now s/p CABG x 4 2019, PAD with claudication s/p angioplasty 2022, DM2, HFrEF (EF 40%) 2/2 ischemic CM, GAURANG, carotid atery disease s/p L CEA (2022) who presented to [facility] after episode of syncope. Patient was found to have NSTEMI, admitted to  CICU after failed LHC attempt. Syncope likely related to initial bradycardia which has since  resolved. Patient also briefly required levophed, but has been off since 8/26. LHC complete 8/28. No interventions. Suspect syncope to low BP. GDMT being adjusted and will go to floor.      HOSPITAL COURSE:  Oliver Zhou is a 73 y.o. male with PMHx of HTN, HLD, CAD s/p multiple PCI with total 8 stents placed (2007, 2015) now s/p CABG x 4 2019, PAD with claudication s/p angioplasty 2022, DM2, HFrEF (EF 40%) 2/2 ischemic CM, GAURANG, carotid atery disease s/p L CEA (2022) who presented to [facility] after episode of syncope. Patient was found to have NSTEMI, admitted to  CICU after failed LHC attempt.     OSH course:  Patient states that he was out eating lunch when he became lightheaded, nauseous, and diaphoretic and then lost consciousness for reportedly almost a minute. EMS was called and pt was found to be bradycardic in the 40s. He was given a dose of atropine and epinephrine and his HR improved en route to ED. EKG upon arrival with fist degree AV block with HR of 70. Admission lab work significant for troponin 33 -> 31, Cr 1.26, Mg 1.3. Patient was noted to be hypotensive to 80s systolic in the ED that was not responsive to 3L of fluid, so patient was started on levophed and admitted to the MICU. Cardiology was consulted due intermittent midsternal chest pressure and NSTEMI, recommended obtaining LHC due to concern for CABG graft jeopardy, however were unsuccessful at cannulating LHC given severe vessel calcification. Levophed weaned off successfully 8/26. Decision was made to transfer patient to  for further cath evaluation.     CICU:  Upon arrival to  CICU, patient's vitals HR 76, RR 24, /66, 94% on RA. Patient denied any chest pain, SOB, lightheadedness, dizziness, nausea, fevers, chills. Patient states that he has been having intermittent mid sternal chest pain/pressure for the past few months. Patient was taken for LHC on 8/28 which showed his known severe 3 vessel disease. Graphs were patent and no  intervention was done. He was brought back to the CICU and heparin drip was stopped after total 48 hours. It was presumed his syncope was vasovagal iso of a low BP. His home medications were adjusted with torsemide and spironolactone being stopped and his metoprolol, imdur, and entresto being reduced. He was being planned for discharge on 8/29, however his BP was low. Not true positive for orthastatics, but his BP dropped from 97/57 lying, to 86/57 standing. Decision made to transfer patient to floor and keep one more day. Will give 1 L LR and monitor BP. Titrate GDMT to BP.    To Do:  [ ] Monitor BP s/p fluids. Titrate gdmt to tolerable BP  [ ] Holter monitor at discharge. Spoke to team and they will hold device for him. But will need updated when he is planned to leave; Ext [phone]  [ ] Needs to follow with his cardiologist Dr. Katherin Quintanilla at [facility] on discharge  [ ] Patient was originally discharging from CICU. Meds to beds were ordered. If medications change for discharge make sure he has the correct ones.       Neuro:   MARTHA     CV:   #NSTEMI  #CAD s/p multiple PCI (2007, 2015) s/p CABG x 4 2019   #HLD  #Syncope  -Syncopy prior to presentation to OSH  - Worsening intermittent chest pressure for past few months  - EKG with no acute changes, T wave inversions, ST segment changes  - Troponin at OSH 33 -> 31 -> 18  - Hx of 8 prior stents, CABG x4 2019 : Radial to LAD, LIMA to diagnoal coming off the stewart of the vein to OM,SVG to PDA. No further cath since CABG   - Unnable to cannulate at OSH due to severely calcified vessels  PLAN:  - Continue ASA, atorvastatin 80 mg,  - heparin gtt stopped post 48 hours  - [facility] complete 8/29. Showed known 3 vessel disease but graphs were patient and no intervention done.  - Syncope presumed to be secondary to a low BP. Suspect his GDMT was too high. He presented to OSH with BP 80s/40s. Adjustment below      #HFrEF (45-50%)  #HTN  - Most recent ECHO done with EF 45-50%, moderate aortic  stenosis    - Patient appears euvolemic on exam, low concern for acute exacerbation   -   PLAN:  - Ordered echo; FU results  -GDMT adjusted:  -Torsemide and katie stopped  -Entresto decreased to 24/26  -Metop decreased to 25 mg  -Jardiance continued at 25 (diabetes)  -Imdur decreased to 30 mg  -Orthostatics with lying BP 97/57 and standing BP 86/57  -Decision made to give a L LR and send to floor to be monitored  -To be discharged with holter monitor (Talked to team already as we thought patient ws being discharged today)  - To follow with his primary cardiologist Dr. Katherin Quintanilla at [facility] on discharge.     #Carotid artery disease s/p L CEA (2022)   #PAD with severe claudication  - S/p Intravascular lithotripsy and angioplasty of left common femoral artery and profunda June 2022  - Restarted xarelto home 8/29  - Continue patient on ASA     Pulm:   MARTHA     GI:  MARTHA     Renal:  MARTHA     Endo:  #DM2  - Last HgA1c 7.1  - Home regiment: Jardiance 25 mg, metformmin 1000 mg BID  PLAN:  - LDSSI   - Restarted jardiance 8/29     F : PRN  E: PRN  N: Cardiac diet     DVT prophylaxis: xarelto     Code Status: Full Code (confirmed on admission)   NOK: Extended Emergency Contact Information  Primary Emergency Contact: Zena Zhou  Mobile Phone: [phone]  Relation: Spouse  Secondary Emergency Contact: Lisha Zhou  Mobile Phone: [phone]  Relation: Daughter           Luis Enrique Evans MD  Internal Medicine, PGY-2

## 2024-08-29 NOTE — HOSPITAL COURSE
Oliver Zhou is a 73 y.o. male with PMHx of HTN, HLD, CAD s/p multiple PCI with total 8 stents placed (2007, 2015) now s/p CABG x 4 2019, PAD with claudication s/p angioplasty 2022, DM2, HFrEF (EF 40%) 2/2 ischemic CM, GAURANG, carotid atery disease s/p L CEA (2022) who [facility] episode of sync[facility]. Patient was found to have NSTEMI, admitted to  CICU after failed LHC attempt At OSH.     OSH course:  Patient states that he was out eating lunch when he became lightheaded, nauseous, and diaphoretic and then lost consciousness for reportedly almost a minute. EMS was called and pt was found to be bradycardic in the 40s. He was given a dose of atropine and epinephrine and his HR improved en route to ED. EKG upon arrival with fist degree AV block with HR of 70. Admission lab work significant for troponin 33 -> 31, Cr 1.26, Mg 1.3. Patient was noted to be hypotensive to 80s systolic in the ED that was not responsive to 3L of fluid, so patient was started on levophed and admitted to the MICU. Cardiology was consulted due intermittent midsternal chest pressure and NSTEMI, recommended obtaining LHC due to concern for CABG graft jeopardy, however were unsuccessful at cannulating LHC given severe vessel calcification. Levophed weaned off successfully 8/26. Decision was made to transfer patient to  for further cath evaluation.     CICU:  Upon arrival to  CICU, patient's vitals HR 76, RR 24, /66, 94% on RA. Patient denied any chest pain, SOB, lightheadedness, dizziness, nausea, fevers, chills. Patient states that he has been having intermittent mid sternal chest pain/pressure for the past few months. Patient was taken for LHC on 8/28 which showed his known severe 3 vessel disease. Atretic LIMA to D1, patent vein graft to distal RCA, patent radial artery to mid LAD, patent vein graft to OM1, no intervention was performed. He was brought back to the CICU, heparin drip was stopped after total 48 hours. It  was presumed his syncope was vasovagal iso of a low BP. His home medications were adjusted with torsemide and spironolactone stopped and his metoprolol, imdur, and Entresto doses reduced. He was planned for discharge on 8/29, however his BP was noted to be low prior to discharge. Not true positive for orthastatics, but his BP dropped from 97/57 lying, to 86/57 standing. Decision made to transfer patient to floor and keep one more day. Pt given 1 L LR and BP monitored overnight.     Floor:    TTE performed 8/29 showed EF 45-50% and moderate to severe aortic valve stenosis. Upon review of prior TTE performed at [facility], AS and EF unchanged from previous. Given patients PCP and Cardiologist at [facility], Holter monitor not placed at discharge. Of note, no events recorded on telemetry during admission. Can be pursued as outpatient if desired.     Patient instructed to take home Torsemide PRN for swelling/SOB/weight gain. Spironolactone stopped until further instructed by provider.    Vascular Medicine appointment scheduled at  to establish care at patient request.     Close follow up with his cardiologist Dr. Katherin Quintanilla at [facility] scheduled for 9/6/24.     Medications delivered to patient to discharge. Patient denied having any other home going needs.     Upon review of medications, lab values, and vital signs.  Pt was deemed stable for discharge in satisfactory condition.     Discharge weight: 83.7 kg     More than 60 minutes were spent in coordinating patient discharge.

## 2024-08-30 VITALS
WEIGHT: 184.53 LBS | RESPIRATION RATE: 18 BRPM | HEIGHT: 64 IN | OXYGEN SATURATION: 96 % | SYSTOLIC BLOOD PRESSURE: 110 MMHG | DIASTOLIC BLOOD PRESSURE: 54 MMHG | BODY MASS INDEX: 31.5 KG/M2 | TEMPERATURE: 98.6 F | HEART RATE: 82 BPM

## 2024-08-30 PROBLEM — I35.0 NONRHEUMATIC AORTIC VALVE STENOSIS: Status: ACTIVE | Noted: 2024-08-30

## 2024-08-30 PROBLEM — E78.49 OTHER HYPERLIPIDEMIA: Status: ACTIVE | Noted: 2024-08-30

## 2024-08-30 PROBLEM — I21.4 NSTEMI (NON-ST ELEVATED MYOCARDIAL INFARCTION) (MULTI): Status: RESOLVED | Noted: 2024-08-27 | Resolved: 2024-08-30

## 2024-08-30 PROBLEM — I65.29 CAROTID ARTERY STENOSIS: Status: ACTIVE | Noted: 2024-08-30

## 2024-08-30 PROBLEM — E11.51 TYPE 2 DIABETES MELLITUS WITH DIABETIC PERIPHERAL ANGIOPATHY WITHOUT GANGRENE, WITHOUT LONG-TERM CURRENT USE OF INSULIN (MULTI): Status: ACTIVE | Noted: 2024-08-30

## 2024-08-30 PROBLEM — I73.9 PAD (PERIPHERAL ARTERY DISEASE) (CMS-HCC): Status: ACTIVE | Noted: 2024-08-30

## 2024-08-30 PROBLEM — R55 SYNCOPE AND COLLAPSE: Status: RESOLVED | Noted: 2024-08-27 | Resolved: 2024-08-30

## 2024-08-30 PROBLEM — I50.42 CHRONIC COMBINED SYSTOLIC (CONGESTIVE) AND DIASTOLIC (CONGESTIVE) HEART FAILURE (MULTI): Status: ACTIVE | Noted: 2024-08-30

## 2024-08-30 PROBLEM — I25.708 CORONARY ARTERY DISEASE OF BYPASS GRAFT OF NATIVE HEART WITH STABLE ANGINA PECTORIS (CMS-HCC): Status: ACTIVE | Noted: 2024-08-30

## 2024-08-30 PROBLEM — I65.29 CAROTID ARTERY STENOSIS: Status: RESOLVED | Noted: 2024-08-30 | Resolved: 2024-08-30

## 2024-08-30 PROBLEM — I10 PRIMARY HYPERTENSION: Status: ACTIVE | Noted: 2024-08-30

## 2024-08-30 LAB
ALBUMIN SERPL BCP-MCNC: 4 G/DL (ref 3.4–5)
ANION GAP SERPL CALC-SCNC: 15 MMOL/L (ref 10–20)
BUN SERPL-MCNC: 24 MG/DL (ref 6–23)
CALCIUM SERPL-MCNC: 9.3 MG/DL (ref 8.6–10.6)
CHLORIDE SERPL-SCNC: 102 MMOL/L (ref 98–107)
CO2 SERPL-SCNC: 25 MMOL/L (ref 21–32)
CREAT SERPL-MCNC: 0.89 MG/DL (ref 0.5–1.3)
EGFRCR SERPLBLD CKD-EPI 2021: 90 ML/MIN/1.73M*2
ERYTHROCYTE [DISTWIDTH] IN BLOOD BY AUTOMATED COUNT: 13.9 % (ref 11.5–14.5)
GLUCOSE BLD MANUAL STRIP-MCNC: 179 MG/DL (ref 74–99)
GLUCOSE BLD MANUAL STRIP-MCNC: 195 MG/DL (ref 74–99)
GLUCOSE SERPL-MCNC: 117 MG/DL (ref 74–99)
HCT VFR BLD AUTO: 38.7 % (ref 41–52)
HGB BLD-MCNC: 12.4 G/DL (ref 13.5–17.5)
MAGNESIUM SERPL-MCNC: 1.79 MG/DL (ref 1.6–2.4)
MCH RBC QN AUTO: 32.6 PG (ref 26–34)
MCHC RBC AUTO-ENTMCNC: 32 G/DL (ref 32–36)
MCV RBC AUTO: 102 FL (ref 80–100)
NRBC BLD-RTO: 0 /100 WBCS (ref 0–0)
PHOSPHATE SERPL-MCNC: 3.5 MG/DL (ref 2.5–4.9)
PLATELET # BLD AUTO: 245 X10*3/UL (ref 150–450)
POTASSIUM SERPL-SCNC: 4.2 MMOL/L (ref 3.5–5.3)
RBC # BLD AUTO: 3.8 X10*6/UL (ref 4.5–5.9)
SODIUM SERPL-SCNC: 138 MMOL/L (ref 136–145)
WBC # BLD AUTO: 10 X10*3/UL (ref 4.4–11.3)

## 2024-08-30 PROCEDURE — 2500000002 HC RX 250 W HCPCS SELF ADMINISTERED DRUGS (ALT 637 FOR MEDICARE OP, ALT 636 FOR OP/ED)

## 2024-08-30 PROCEDURE — 2500000001 HC RX 250 WO HCPCS SELF ADMINISTERED DRUGS (ALT 637 FOR MEDICARE OP): Performed by: PHYSICIAN ASSISTANT

## 2024-08-30 PROCEDURE — 36415 COLL VENOUS BLD VENIPUNCTURE: CPT

## 2024-08-30 PROCEDURE — 82947 ASSAY GLUCOSE BLOOD QUANT: CPT

## 2024-08-30 PROCEDURE — 85027 COMPLETE CBC AUTOMATED: CPT

## 2024-08-30 PROCEDURE — 99239 HOSP IP/OBS DSCHRG MGMT >30: CPT | Performed by: INTERNAL MEDICINE

## 2024-08-30 PROCEDURE — 80069 RENAL FUNCTION PANEL: CPT

## 2024-08-30 PROCEDURE — 83735 ASSAY OF MAGNESIUM: CPT

## 2024-08-30 RX ORDER — INSULIN LISPRO 100 [IU]/ML
0-10 INJECTION, SOLUTION INTRAVENOUS; SUBCUTANEOUS
Status: DISCONTINUED | OUTPATIENT
Start: 2024-08-30 | End: 2024-08-30 | Stop reason: HOSPADM

## 2024-08-30 RX ORDER — TORSEMIDE 10 MG/1
10 TABLET ORAL DAILY PRN
Start: 2024-08-30

## 2024-08-30 RX ORDER — VALACYCLOVIR HYDROCHLORIDE 500 MG/1
500 TABLET, FILM COATED ORAL 2 TIMES DAILY
Status: DISCONTINUED | OUTPATIENT
Start: 2024-08-30 | End: 2024-08-30 | Stop reason: HOSPADM

## 2024-08-30 ASSESSMENT — COGNITIVE AND FUNCTIONAL STATUS - GENERAL
MOBILITY SCORE: 24
DAILY ACTIVITIY SCORE: 24

## 2024-08-30 ASSESSMENT — PAIN SCALES - GENERAL: PAINLEVEL_OUTOF10: 0 - NO PAIN

## 2024-08-30 ASSESSMENT — PAIN - FUNCTIONAL ASSESSMENT: PAIN_FUNCTIONAL_ASSESSMENT: 0-10

## 2024-08-30 NOTE — DISCHARGE SUMMARY
Discharge Diagnosis  Syncope and collapse    Issues Requiring Follow-Up  Moderate AS, CHF, GDMT, Hypotension    Test Results Pending At Discharge  Pending Labs       No current pending labs.            Hospital Course  Oliver Zhou is a 73 y.o. male with PMHx of HTN, HLD, CAD s/p multiple PCI with total 8 stents placed (2007, 2015) now s/p CABG x 4 2019, PAD with claudication s/p angioplasty 2022, DM2, HFrEF (EF 40%) 2/2 ischemic CM, GAURANG, carotid atery disease s/p L CEA (2022) who presented to [facility] after episode of syncope. Patient was found to have NSTEMI, admitted to  CICU after failed LHC attempt At OSH.     OSH course:  Patient states that he was out eating lunch when he became lightheaded, nauseous, and diaphoretic and then lost consciousness for reportedly almost a minute. EMS was called and pt was found to be bradycardic in the 40s. He was given a dose of atropine and epinephrine and his HR improved en route to ED. EKG upon arrival with fist degree AV block with HR of 70. Admission lab work significant for troponin 33 -> 31, Cr 1.26, Mg 1.3. Patient was noted to be hypotensive to 80s systolic in the ED that was not responsive to 3L of fluid, so patient was started on levophed and admitted to the MICU. Cardiology was consulted due intermittent midsternal chest pressure and NSTEMI, recommended obtaining LHC due to concern for CABG graft jeopardy, however were unsuccessful at cannulating LHC given severe vessel calcification. Levophed weaned off successfully 8/26. Decision was made to transfer patient to  for further cath evaluation.     CICU:  Upon arrival to  CICU, patient's vitals HR 76, RR 24, /66, 94% on RA. Patient denied any chest pain, SOB, lightheadedness, dizziness, nausea, fevers, chills. Patient states that he has been having intermittent mid sternal chest pain/pressure for the past few months. Patient was taken for LHC on 8/28 which showed his known severe 3 vessel disease. Atretic  LIMA to D1, patent vein graft to distal RCA, patent radial artery to mid LAD, patent vein graft to OM1, no intervention was performed. He was brought back to the CICU, heparin drip was stopped after total 48 hours. It was presumed his syncope was vasovagal iso of a low BP. His home medications were adjusted with torsemide and spironolactone stopped and his metoprolol, imdur, and Entresto doses reduced. He was planned for discharge on 8/29, however his BP was noted to be low prior to discharge. Not true positive for orthastatics, but his BP dropped from 97/57 lying, to 86/57 standing. Decision made to transfer patient to floor and keep one more day. Pt given 1 L LR and BP monitored overnight.     Floor:    TTE performed 8/29 showed EF 45-50% and moderate to severe aortic valve stenosis. Upon review of prior TTE performed at [facility], AS and EF unchanged from previous. Given patients PCP and Cardiologist at [facility], Holter monitor not placed at discharge. Of note, no events recorded on telemetry during admission. Can be pursued as outpatient if desired.     Patient instructed to take home Torsemide PRN for swelling/SOB/weight gain. Spironolactone stopped until further instructed by provider.    Vascular Medicine appointment scheduled at  to establish care at patient request.     Close follow up with his cardiologist Dr. Katherin Quintanilla at [facility] scheduled for 9/6/24.     Medications delivered to patient to discharge. Patient denied having any other home going needs.     Upon review of medications, lab values, and vital signs.  Pt was deemed stable for discharge in satisfactory condition.     Discharge weight: 83.7 kg     More than 60 minutes were spent in coordinating patient discharge.      Physical Exam At Time of Discharge:    Constitutional: No acute distress, cooperative, pleasant  HEENT: No conjunctival icterus, EOMI grossly intact   Cardiovascular: RRR, normal S1/S2, mild systolic murmur  Pulmonary: Clear to auscultation  b/l, no wheezes/crackles/rhonchi, no increased work of breathing on RA  GI: Soft, non-tender, non-distended, normoactive bowel sounds  Lower extremities: Warm and well perfused, no lower extremity edema. Pulses palpable  Neuro: A&O x3, able to move all 4 extremities spontaneously  Psych: Appropriate mood and affect     Home Medications     Medication List      START taking these medications     Entresto 24-26 mg tablet; Generic drug: sacubitriL-valsartan; Take 1   tablet by mouth 2 times a day.; Replaces: Entresto 49-51 mg tablet     CHANGE how you take these medications     isosorbide mononitrate ER 30 mg 24 hr tablet; Commonly known as: Imdur;   Take 1 tablet (30 mg) by mouth once daily. Do not crush or chew.; What   changed: medication strength, how much to take, when to take this,   additional instructions   metoprolol succinate XL 25 mg 24 hr tablet; Commonly known as:   Toprol-XL; Take 1 tablet (25 mg) by mouth once daily. Do not crush or   chew.; What changed: medication strength, See the new instructions.   torsemide 10 mg tablet; Commonly known as: Demadex; Take 1 tablet (10   mg) by mouth once daily as needed (Take if you notice swelling or have   SOB, gain more than 3lbs in one day or 5lbs in one week. Call your   cardiologist's office.).; What changed: when to take this, reasons to take   this     CONTINUE taking these medications     aspirin 81 mg EC tablet   atorvastatin 80 mg tablet; Commonly known as: Lipitor   cyanocobalamin 1,000 mcg tablet; Commonly known as: Vitamin B-12   empagliflozin 25 mg; Commonly known as: Jardiance   metFORMIN 500 mg tablet; Commonly known as: Glucophage   nitroglycerin 0.4 mg SL tablet; Commonly known as: Nitrostat   traMADol 50 mg tablet; Commonly known as: Ultram   valACYclovir 500 mg tablet; Commonly known as: Valtrex   Xarelto 2.5 mg tablet; Generic drug: rivaroxaban     STOP taking these medications     Entresto 49-51 mg tablet; Generic drug: sacubitriL-valsartan;  Replaced   by: Entresto 24-26 mg tablet   spironolactone 25 mg tablet; Commonly known as: Aldactone       Outpatient Follow-Up  Katherin Quintanilla MD  Cardiology [phone] [facility] [address] [place] [address]      Appointment: 9/6/2024  8:45 AM       Future Appointments   Date Time Provider Department Center   12/2/2024  1:45 PM Cristina Tom MD [id number]       Av Jimenez, SARIKA-CNP

## 2024-08-30 NOTE — DISCHARGE INSTRUCTIONS
Please take note of your medications doses as they have changed. You may cut your current medications in half. For now, stop taking Spironolactone until you are seen by your cardiologist and are instructed otherwise.    Your Torsemide has changed from daily to as needed. Take if you feel more SOB, notice increased swelling, or if you gain more than 3lbs in one day or 5lbs in one week and call your cardiologist's office.     Your echocardiogram showed EF 45-50% and moderate aortic valve stenosis, this was similar to your last echo performed at [facility]. This can continue to be monitored by your Cardiologist. The heart catheterization showed that your previous bypass grafts are patent and no intervention was performed.    Please check your BP twice daily, 1 hour after taking your medications in the morning and again before bed. Keep a record and bring to your next cardiology appointment.     You have been referred to Vascular Medicine and an appointment has been scheduled. Please call to reschedule if the date or time is not convenient for you.    Please take the medications listed on these instructions as prescribed until you are seen at a follow up appointment.    It was a pleasure to have met and care for you!

## 2024-08-30 NOTE — NURSING NOTE
Patient has been all cleared for discharge to go home. Patient is alert and oriented x4, on room air. AVS has been printed and reviewed with the patient and patients wife. Discharge instructions have been explained, medications have been reviewed, and all questions have been answered; pt verbalized understanding. All prescriptions have been handed to the patient. IV has been removed with tip intact, patient tolerated well. Patient able to dress himself. Tele has been removed. Brought down to private vehicle via wheelchair with all belongings.

## 2024-08-30 NOTE — CARE PLAN
Problem: Pain  Goal: Takes deep breaths with improved pain control throughout the shift  8/30/2024 1307 by Sophie Oseguera RN  Outcome: Adequate for Discharge  8/30/2024 1307 by Sophie Oseguera RN  Outcome: Progressing  Goal: Turns in bed with improved pain control throughout the shift  8/30/2024 1307 by Sophie Oseguera RN  Outcome: Adequate for Discharge  8/30/2024 1307 by Sophie Oseguera RN  Outcome: Progressing  Goal: Walks with improved pain control throughout the shift  8/30/2024 1307 by Sophie Oseguera RN  Outcome: Adequate for Discharge  8/30/2024 1307 by Sophie Oseguera RN  Outcome: Progressing  Goal: Performs ADL's with improved pain control throughout shift  8/30/2024 1307 by Sophie Oseguera RN  Outcome: Adequate for Discharge  8/30/2024 1307 by Sophie Oseguera RN  Outcome: Progressing  Goal: Participates in PT with improved pain control throughout the shift  8/30/2024 1307 by Sophie Oseguera RN  Outcome: Adequate for Discharge  8/30/2024 1307 by Sophie Oseguera RN  Outcome: Progressing  Goal: Free from opioid side effects throughout the shift  8/30/2024 1307 by Sophie Oseguera RN  Outcome: Adequate for Discharge  8/30/2024 1307 by Sophie Oseguera RN  Outcome: Progressing  Goal: Free from acute confusion related to pain meds throughout the shift  8/30/2024 1307 by Sophie Oseguera RN  Outcome: Adequate for Discharge  8/30/2024 1307 by Sophie Oseguera RN  Outcome: Progressing     Problem: Pain - Adult  Goal: Verbalizes/displays adequate comfort level or baseline comfort level  8/30/2024 1307 by Sophie Oseguera RN  Outcome: Adequate for Discharge  8/30/2024 1307 by Sophie Oseguera RN  Outcome: Progressing     Problem: Safety - Adult  Goal: Free from fall injury  8/30/2024 1307 by Sophie Oseguera RN  Outcome: Adequate for Discharge  8/30/2024 1307 by Sophie Oseguera RN  Outcome: Progressing     Problem: Discharge Planning  Goal: Discharge to home or other facility with appropriate resources  8/30/2024 1307 by Sophie Oseguera  RN  Outcome: Adequate for Discharge  8/30/2024 1307 by Sophie Oseguera RN  Outcome: Progressing     Problem: Chronic Conditions and Co-morbidities  Goal: Patient's chronic conditions and co-morbidity symptoms are monitored and maintained or improved  8/30/2024 1307 by Sophie Oseguera RN  Outcome: Adequate for Discharge  8/30/2024 1307 by Sophie Oseguera RN  Outcome: Progressing

## 2024-08-31 LAB — BACTERIA BLD CULT: NORMAL

## 2024-09-06 NOTE — PROGRESS NOTES
Physician Progress Note      PATIENT:               NUSRAT RUELAS  CSN #:                  [id number]  :                       1950  ADMIT DATE:       2024 12:10 PM  DISCH DATE:        2024 5:34 PM  RESPONDING  PROVIDER #:        Sue Benjamin MD          QUERY TEXT:    Patient admitted with hypotension with syncope, DI, and chest pain, likely   accelerating angina. Cardiogenic shock noted per Dr. Rowan -; \"no   true evidence of end organ dysfunction to show true shock\" per H&P. Pt was   treated with IV fluids and Levophed gtt. If possible, please document if you   were evaluating and /or treating any of the following:    The medical record reflects the following:  Risk Factors: unstable angina, bradycardia, moderate AS  Clinical Indicators: H&P \"Hypotension with syncope- pt became lightheaded   while eating lunch and lost consciousness for   1 minute- no improvement in BP after 2L NS, MAP remains <65 so levophed   started- no true evidence of end organ dysfunction to show true shock at this   time\"  attending \"Hemodynamically on the dry side.  Responded to IV   fluid.\"  SBP 76-86, MAPs 46-62.  Treatment: NS 2L bolus, then NS at 125 ml/hr, Levophed, Toprol, ICU   monitoring,    Thank you, Lora Degroot RN BSN [facility]  [phone]  Options provided:  -- Hypotension confirmed and cardiogenic shock ruled out  -- Hypovolemic shock confirmed  -- Cardiogenic shock confirmed  -- Other - I will add my own diagnosis  -- Disagree - Not applicable / Not valid  -- Disagree - Clinically unable to determine / Unknown  -- Refer to Clinical Documentation Reviewer    PROVIDER RESPONSE TEXT:    Hypovolemic shock confirmed    Query created by: Connor Degroot on 2024 2:55 PM      Electronically signed by:  Sue Benjamin MD 2024 8:35 AM

## 2024-09-09 LAB
ATRIAL RATE: 68 BPM
P AXIS: 32 DEGREES
P OFFSET: 191 MS
P ONSET: 132 MS
PR INTERVAL: 192 MS
Q ONSET: 228 MS
QRS COUNT: 11 BEATS
QRS DURATION: 92 MS
QT INTERVAL: 380 MS
QTC CALCULATION(BAZETT): 404 MS
QTC FREDERICIA: 396 MS
R AXIS: -36 DEGREES
T AXIS: 89 DEGREES
T OFFSET: 418 MS
VENTRICULAR RATE: 68 BPM

## 2024-09-12 LAB
ATRIAL RATE: 56 BPM
P AXIS: 57 DEGREES
P OFFSET: 174 MS
P ONSET: 112 MS
PR INTERVAL: 206 MS
Q ONSET: 215 MS
QRS COUNT: 9 BEATS
QRS DURATION: 94 MS
QT INTERVAL: 432 MS
QTC CALCULATION(BAZETT): 416 MS
QTC FREDERICIA: 422 MS
R AXIS: -32 DEGREES
T AXIS: 110 DEGREES
T OFFSET: 431 MS
VENTRICULAR RATE: 56 BPM

## 2024-10-21 DIAGNOSIS — I73.9 CLAUDICATION (CMS-HCC): ICD-10-CM

## 2024-10-30 ENCOUNTER — APPOINTMENT (OUTPATIENT)
Dept: VASCULAR SURGERY | Facility: HOSPITAL | Age: 74
End: 2024-10-30
Payer: MEDICARE

## 2024-11-01 ENCOUNTER — HOSPITAL ENCOUNTER (OUTPATIENT)
Dept: RADIOLOGY | Facility: HOSPITAL | Age: 74
Discharge: HOME | End: 2024-11-01
Payer: MEDICARE

## 2024-11-01 DIAGNOSIS — I73.9 CLAUDICATION (CMS-HCC): ICD-10-CM

## 2024-11-01 PROCEDURE — 93922 UPR/L XTREMITY ART 2 LEVELS: CPT

## 2024-11-02 PROCEDURE — 93922 UPR/L XTREMITY ART 2 LEVELS: CPT | Performed by: STUDENT IN AN ORGANIZED HEALTH CARE EDUCATION/TRAINING PROGRAM

## 2024-11-21 ENCOUNTER — OFFICE VISIT (OUTPATIENT)
Dept: VASCULAR SURGERY | Facility: CLINIC | Age: 74
End: 2024-11-21
Payer: MEDICARE

## 2024-11-21 VITALS
SYSTOLIC BLOOD PRESSURE: 134 MMHG | DIASTOLIC BLOOD PRESSURE: 67 MMHG | TEMPERATURE: 96.6 F | HEART RATE: 86 BPM | HEIGHT: 64 IN | BODY MASS INDEX: 31.92 KG/M2 | RESPIRATION RATE: 14 BRPM | WEIGHT: 187 LBS

## 2024-11-21 DIAGNOSIS — I73.9 PAD (PERIPHERAL ARTERY DISEASE) (CMS-HCC): Primary | ICD-10-CM

## 2024-11-21 DIAGNOSIS — I73.9 PAD (PERIPHERAL ARTERY DISEASE) (CMS-HCC): ICD-10-CM

## 2024-11-21 DIAGNOSIS — I65.23 CAROTID STENOSIS, ASYMPTOMATIC, BILATERAL: ICD-10-CM

## 2024-11-21 DIAGNOSIS — I70.229 CRITICAL ISCHEMIA OF LOWER EXTREMITY (MULTI): ICD-10-CM

## 2024-11-21 PROCEDURE — 3008F BODY MASS INDEX DOCD: CPT | Performed by: SURGERY

## 2024-11-21 PROCEDURE — 3075F SYST BP GE 130 - 139MM HG: CPT | Performed by: SURGERY

## 2024-11-21 PROCEDURE — 1159F MED LIST DOCD IN RCRD: CPT | Performed by: SURGERY

## 2024-11-21 PROCEDURE — 99205 OFFICE O/P NEW HI 60 MIN: CPT | Performed by: SURGERY

## 2024-11-21 PROCEDURE — 99215 OFFICE O/P EST HI 40 MIN: CPT | Performed by: SURGERY

## 2024-11-21 PROCEDURE — 1036F TOBACCO NON-USER: CPT | Performed by: SURGERY

## 2024-11-21 PROCEDURE — 3078F DIAST BP <80 MM HG: CPT | Performed by: SURGERY

## 2024-11-21 NOTE — PROGRESS NOTES
Vascular Surgery Clinic Note    CC: PAD    HPI:  Oliver Zhou is 74 y.o. male with history of carotid stenosis s/p bilateral CEA by Dr. Leos. He has CAD s/p CABG, recent cath showing atretic LIMA, GAURANG, DM2, obesity. He quit smoking about 10 years ago. He reports worsening pain in both legs with walking. He can walk aobut 10-20 feet before getting severe pain in the thighs and calves. He has pain in the left foot when he tries to sleep.   He has had prior CABG, and prior upper midline abdominal hernia repair with mesh.    Medical History:   has no past medical history on file.    Meds:   Current Outpatient Medications on File Prior to Visit   Medication Sig Dispense Refill    aspirin 81 mg EC tablet Take 1 tablet (81 mg) by mouth once daily.      atorvastatin (Lipitor) 80 mg tablet Take 1 tablet (80 mg) by mouth once daily at bedtime.      cyanocobalamin (Vitamin B-12) 1,000 mcg tablet Take 1 tablet (1,000 mcg) by mouth once daily.      empagliflozin (Jardiance) 25 mg Take 1 tablet (25 mg) by mouth once daily.      isosorbide mononitrate ER (Imdur) 30 mg 24 hr tablet Take 1 tablet (30 mg) by mouth once daily. Do not crush or chew. 30 tablet 0    metFORMIN (Glucophage) 500 mg tablet Take 2 tablets (1,000 mg) by mouth 2 times daily (morning and late afternoon).      metoprolol succinate XL (Toprol-XL) 25 mg 24 hr tablet Take 1 tablet (25 mg) by mouth once daily. Do not crush or chew. 30 tablet 0    nitroglycerin (Nitrostat) 0.4 mg SL tablet Dissolve 1 tablet under the tongue as needed. FOR CHEST PAIN. IF NO RELIEF CALL 911.      sacubitriL-valsartan (Entresto) 24-26 mg tablet Take 1 tablet by mouth 2 times a day. 60 tablet 0    torsemide (Demadex) 10 mg tablet Take 1 tablet (10 mg) by mouth once daily as needed (Take if you notice swelling or have SOB, gain more than 3lbs in one day or 5lbs in one week. Call your cardiologist's office.).      valACYclovir (Valtrex) 500 mg tablet Take 1 tablet (500 mg) by mouth 2 times  a day.      Xarelto 2.5 mg tablet Take 1 tablet (2.5 mg) by mouth twice a day.       No current facility-administered medications on file prior to visit.        Allergies:   No Known Allergies    SH:    Social Drivers of Health     Tobacco Use: Medium Risk (11/21/2024)    Patient History     Smoking Tobacco Use: Former     Smokeless Tobacco Use: Never     Passive Exposure: Not on file   Alcohol Use: Not At Risk (8/27/2024)    AUDIT-C     Frequency of Alcohol Consumption: Never     Average Number of Drinks: Patient does not drink     Frequency of Binge Drinking: Never   Financial Resource Strain: Low Risk  (8/29/2024)    Overall Financial Resource Strain (CARDIA)     Difficulty of Paying Living Expenses: Not very hard   Food Insecurity: No Food Insecurity (8/28/2024)    Hunger Vital Sign     Worried About Running Out of Food in the Last Year: Never true     Ran Out of Food in the Last Year: Never true   Transportation Needs: No Transportation Needs (8/29/2024)    PRAPARE - Transportation     Lack of Transportation (Medical): No     Lack of Transportation (Non-Medical): No   Physical Activity: Sufficiently Active (6/25/2024)    Received from [facility]    Exercise Vital Sign     Days of Exercise per Week: 5 days     Minutes of Exercise per Session: 30 min   Recent Concern: Physical Activity - Inactive (5/14/2024)    Received from [facility]    Exercise Vital Sign     Days of Exercise per Week: 0 days     Minutes of Exercise per Session: 0 min   Stress: Stress Concern Present (5/14/2024)    Received from [facility]    [employer] - Occupational Stress Questionnaire     Feeling of Stress : To some extent   Social Connections: Unknown (8/28/2024)    Social Connection and Isolation Panel [NHANES]     Frequency of Communication with Friends and Family: Not on file     Frequency of Social Gatherings with Friends and Family: Not on file     Attends Yarsani Services: Not on  file     Active Member of Clubs or Organizations: Not on file     Attends Club or Organization Meetings: Not on file     Marital Status:    Intimate Partner Violence: Not on file   Depression: At risk (9/5/2024)    Received from [facility]    PHQ-2     PHQ-2 score: 3   Housing Stability: Low Risk  (8/29/2024)    Housing Stability Vital Sign     Unable to Pay for Housing in the Last Year: No     Number of Times Moved in the Last Year: 1     Homeless in the Last Year: No   Utilities: Not At Risk (8/28/2024)    [facility] Utilities     Threatened with loss of utilities: No   Digital Equity: Not on file   Health Literacy: Adequate Health Literacy (8/28/2024)     Health Literacy     Frequency of need for help with medical instructions: Never        FH:  No family history on file.     ROS:  All systems were reviewed and are negative except as per HPI.    Objective:  Vitals:  Vitals:    11/21/24 1047   BP: 134/67   Pulse: 86   Resp: 14   Temp: 35.9 °C (96.6 °F)        Exam:  In NAD, well appearing  Abd Soft, ND/NT  Vascular examination:  No femoral or pedal pulses  Feet are cool, left foot with dependent rubor, no ulceration    Assessment & Plan:  Oliver Zhou is 74 y.o. male with rest pain left leg. No femoral pulses.  I reviewed ALEX which is 0.3 on the left, 0.5 on the right.  I will obtain CTA runoff.  He needs cardiology evaluation to determine suitability for surgical revascularization.      I spent a total of 60 minutes on the day of the visit.         Robson Mauricio M.D.

## 2024-12-02 ENCOUNTER — APPOINTMENT (OUTPATIENT)
Dept: CARDIOLOGY | Facility: CLINIC | Age: 74
End: 2024-12-02
Payer: MEDICARE

## 2024-12-06 DIAGNOSIS — I73.9 PAD (PERIPHERAL ARTERY DISEASE) (CMS-HCC): Primary | ICD-10-CM

## 2025-01-16 ENCOUNTER — HOSPITAL ENCOUNTER (OUTPATIENT)
Dept: RADIOLOGY | Facility: HOSPITAL | Age: 75
Discharge: HOME | End: 2025-01-16
Payer: MEDICARE

## 2025-01-16 DIAGNOSIS — I65.23 CAROTID STENOSIS, ASYMPTOMATIC, BILATERAL: ICD-10-CM

## 2025-01-16 DIAGNOSIS — I73.9 PAD (PERIPHERAL ARTERY DISEASE) (CMS-HCC): ICD-10-CM

## 2025-01-16 PROCEDURE — 2550000001 HC RX 255 CONTRASTS: Performed by: SURGERY

## 2025-01-16 PROCEDURE — 93880 EXTRACRANIAL BILAT STUDY: CPT

## 2025-01-16 PROCEDURE — 75635 CT ANGIO ABDOMINAL ARTERIES: CPT

## 2025-01-16 PROCEDURE — 93880 EXTRACRANIAL BILAT STUDY: CPT | Performed by: RADIOLOGY

## 2025-01-16 RX ADMIN — IOHEXOL 100 ML: 350 INJECTION, SOLUTION INTRAVENOUS at 10:59

## 2025-01-23 ENCOUNTER — OFFICE VISIT (OUTPATIENT)
Dept: VASCULAR SURGERY | Facility: CLINIC | Age: 75
End: 2025-01-23
Payer: MEDICARE

## 2025-01-23 VITALS
TEMPERATURE: 97.5 F | SYSTOLIC BLOOD PRESSURE: 127 MMHG | BODY MASS INDEX: 31.92 KG/M2 | HEART RATE: 91 BPM | WEIGHT: 187 LBS | DIASTOLIC BLOOD PRESSURE: 64 MMHG | HEIGHT: 64 IN

## 2025-01-23 DIAGNOSIS — I73.9 PAD (PERIPHERAL ARTERY DISEASE) (CMS-HCC): ICD-10-CM

## 2025-01-23 PROCEDURE — 3074F SYST BP LT 130 MM HG: CPT | Performed by: SURGERY

## 2025-01-23 PROCEDURE — 99215 OFFICE O/P EST HI 40 MIN: CPT | Performed by: SURGERY

## 2025-01-23 PROCEDURE — 3008F BODY MASS INDEX DOCD: CPT | Performed by: SURGERY

## 2025-01-23 PROCEDURE — 3078F DIAST BP <80 MM HG: CPT | Performed by: SURGERY

## 2025-01-23 PROCEDURE — 1159F MED LIST DOCD IN RCRD: CPT | Performed by: SURGERY

## 2025-01-23 ASSESSMENT — PATIENT HEALTH QUESTIONNAIRE - PHQ9
SUM OF ALL RESPONSES TO PHQ9 QUESTIONS 1 AND 2: 0
2. FEELING DOWN, DEPRESSED OR HOPELESS: NOT AT ALL
1. LITTLE INTEREST OR PLEASURE IN DOING THINGS: NOT AT ALL

## 2025-01-23 NOTE — PROGRESS NOTES
Vascular Surgery Clinic Note    CC: PAD    HPI:  Oliver Zhou is 74 y.o. male with history of CAD s/p CABG, DM2, hld, and carotid disease s/p bilateral CEA by Dr. [place]o. He sees me for PAD of legs. Left leg is worse he claudicates at very short distances, and occasionally has pain at rest if he lays in bed. He is able to sleep in a recliner without having pain in his feet. ALEX 0.3 on the left, 0.5 on the right. I reviewed recent CTA runoff that shows very calcified vessels and near occlusion of left CFA with diseased profunda distal branches, and  of the SFA.  I reviewed his cardiac cath for NSTEMI in August 2024 that showed severe 3v CAD, atretic LIMA to D1, and patent radial and vein grafts. His EF is 45-50%.    Medical History:   has no past medical history on file.    Meds:   Current Outpatient Medications on File Prior to Visit   Medication Sig Dispense Refill    metoprolol succinate XL (Toprol-XL) 25 mg 24 hr tablet Take 1 tablet (25 mg) by mouth once daily. Do not crush or chew. (Patient taking differently: Take 1 tablet (25 mg) by mouth once daily. Do not crush or chew. Takes 1.5 tablets daily) 30 tablet 0    aspirin 81 mg EC tablet Take 1 tablet (81 mg) by mouth once daily.      atorvastatin (Lipitor) 80 mg tablet Take 1 tablet (80 mg) by mouth once daily at bedtime.      cyanocobalamin (Vitamin B-12) 1,000 mcg tablet Take 1 tablet (1,000 mcg) by mouth once daily.      empagliflozin (Jardiance) 25 mg Take 1 tablet (25 mg) by mouth once daily.      isosorbide mononitrate ER (Imdur) 30 mg 24 hr tablet Take 1 tablet (30 mg) by mouth once daily. Do not crush or chew. 30 tablet 0    metFORMIN (Glucophage) 500 mg tablet Take 2 tablets (1,000 mg) by mouth 2 times daily (morning and late afternoon).      nitroglycerin (Nitrostat) 0.4 mg SL tablet Dissolve 1 tablet under the tongue as needed. FOR CHEST PAIN. IF NO RELIEF CALL 911.      sacubitriL-valsartan (Entresto) 24-26 mg tablet Take 1 tablet by mouth 2 times  a day. 60 tablet 0    torsemide (Demadex) 10 mg tablet Take 1 tablet (10 mg) by mouth once daily as needed (Take if you notice swelling or have SOB, gain more than 3lbs in one day or 5lbs in one week. Call your cardiologist's office.).      valACYclovir (Valtrex) 500 mg tablet Take 1 tablet (500 mg) by mouth 2 times a day.      Xarelto 2.5 mg tablet Take 1 tablet (2.5 mg) by mouth twice a day.       No current facility-administered medications on file prior to visit.        Allergies:   No Known Allergies    SH:    Social Drivers of Health     Tobacco Use: Medium Risk (1/14/2025)    Received from [facility]    Patient History     Smoking Tobacco Use: Former     Smokeless Tobacco Use: Never     Passive Exposure: Not on file   Alcohol Use: Not At Risk (8/27/2024)    AUDIT-C     Frequency of Alcohol Consumption: Never     Average Number of Drinks: Patient does not drink     Frequency of Binge Drinking: Never   Financial Resource Strain: Low Risk  (8/29/2024)    Overall Financial Resource Strain (CARDIA)     Difficulty of Paying Living Expenses: Not very hard   Food Insecurity: No Food Insecurity (8/28/2024)    Hunger Vital Sign     Worried About Running Out of Food in the Last Year: Never true     Ran Out of Food in the Last Year: Never true   Transportation Needs: No Transportation Needs (8/29/2024)    PRAPARE - Transportation     Lack of Transportation (Medical): No     Lack of Transportation (Non-Medical): No   Physical Activity: Sufficiently Active (6/25/2024)    Received from [facility]    Exercise Vital Sign     Days of Exercise per Week: 5 days     Minutes of Exercise per Session: 30 min   Recent Concern: Physical Activity - Inactive (5/14/2024)    Received from [facility]    Exercise Vital Sign     Days of Exercise per Week: 0 days     Minutes of Exercise per Session: 0 min   Stress: Stress Concern Present (5/14/2024)    Received from [facility]  [facility] - Occupational Stress Questionnaire     Feeling of Stress : To some extent   Social Connections: Unknown (8/28/2024)    Social Connection and Isolation Panel [NHANES]     Frequency of Communication with Friends and Family: Not on file     Frequency of Social Gatherings with Friends and Family: Not on file     Attends Yarsani Services: Not on file     Active Member of Clubs or Organizations: Not on file     Attends Club or Organization Meetings: Not on file     Marital Status:    Intimate Partner Violence: Not on file   Depression: Not at risk (1/23/2025)    PHQ-2     PHQ-2 Score: 0   Housing Stability: Low Risk  (8/29/2024)    Housing Stability Vital Sign     Unable to Pay for Housing in the Last Year: No     Number of Times Moved in the Last Year: 1     Homeless in the Last Year: No   Utilities: Not At Risk (8/28/2024)    [facility] Utilities     Threatened with loss of utilities: No   Digital Equity: Not on file   Health Literacy: Adequate Health Literacy (8/28/2024)     Health Literacy     Frequency of need for help with medical instructions: Never        FH:  No family history on file.     ROS:  All systems were reviewed and are negative except as per HPI.    Objective:  Vitals:  Vitals:    01/23/25 1126   BP: 127/64   Pulse: 91   Temp: 36.4 °C (97.5 °F)        Exam:  In NAD, well appearing  Abd Soft, ND/NT  Vascular examination:  No femoral or pedal pulses on the left. Left foot is not ruborus, no wounds, slightly cool  No right pedal pulses, foot is warm    Assessment & Plan:  Oliver Zhou is 74 y.o. male with severe claudication, but I am not convinced he has true rest pain. I reassured him I do not think he has imminent risk of limb loss. L CFA endart would be the operation of choice but I will need to reach out to his cardiologist to get their opinion on his cardiac risk. We discussed endovascular CFA intervention if he has limb threat and is not a good surgical candidate.  I advised him to lose  tal and keep walking in the mean time. He has not smoked in 11 years. RTC 3 mo with ALEX.      I spent a total of 40 minutes on the day of the visit.         Robson Mauricio M.D.

## 2025-04-03 ENCOUNTER — HOSPITAL ENCOUNTER (OUTPATIENT)
Dept: RADIOLOGY | Facility: HOSPITAL | Age: 75
Discharge: HOME | End: 2025-04-03
Payer: MEDICARE

## 2025-04-03 ENCOUNTER — APPOINTMENT (OUTPATIENT)
Dept: VASCULAR SURGERY | Facility: CLINIC | Age: 75
End: 2025-04-03
Payer: MEDICARE

## 2025-04-03 ENCOUNTER — OFFICE VISIT (OUTPATIENT)
Dept: VASCULAR SURGERY | Facility: CLINIC | Age: 75
End: 2025-04-03
Payer: MEDICARE

## 2025-04-03 VITALS
HEART RATE: 86 BPM | WEIGHT: 170.5 LBS | BODY MASS INDEX: 29.11 KG/M2 | SYSTOLIC BLOOD PRESSURE: 91 MMHG | RESPIRATION RATE: 16 BRPM | TEMPERATURE: 97.3 F | DIASTOLIC BLOOD PRESSURE: 39 MMHG | HEIGHT: 64 IN

## 2025-04-03 DIAGNOSIS — I73.9 PAD (PERIPHERAL ARTERY DISEASE) (CMS-HCC): ICD-10-CM

## 2025-04-03 DIAGNOSIS — I73.9 CLAUDICATION: Primary | ICD-10-CM

## 2025-04-03 PROCEDURE — 99215 OFFICE O/P EST HI 40 MIN: CPT | Performed by: SURGERY

## 2025-04-03 PROCEDURE — 93922 UPR/L XTREMITY ART 2 LEVELS: CPT

## 2025-04-03 PROCEDURE — 3008F BODY MASS INDEX DOCD: CPT | Performed by: SURGERY

## 2025-04-03 PROCEDURE — 3078F DIAST BP <80 MM HG: CPT | Performed by: SURGERY

## 2025-04-03 PROCEDURE — 3074F SYST BP LT 130 MM HG: CPT | Performed by: SURGERY

## 2025-04-03 PROCEDURE — 93922 UPR/L XTREMITY ART 2 LEVELS: CPT | Performed by: SURGERY

## 2025-04-03 PROCEDURE — 1159F MED LIST DOCD IN RCRD: CPT | Performed by: SURGERY

## 2025-04-03 RX ORDER — CILOSTAZOL 100 MG/1
100 TABLET ORAL 2 TIMES DAILY
Qty: 180 TABLET | Refills: 3 | Status: SHIPPED | OUTPATIENT
Start: 2025-04-03 | End: 2026-04-03

## 2025-04-03 NOTE — PROGRESS NOTES
Vascular Surgery Clinic Note    CC: PAD    HPI:  Olievr Zhou is 74 y.o. male with history of severe LLE claudication. I reviewed ALEX which is worse today 0.25 from 0.3 in November. He continues to have severe clauidcation, but no pain at rest, and no tissue loss.   Known history of CAD s/p CABG, DM2, carotid disease s/p b/l CEA. Recent NSTEMI 8/2024.    Medical History:   has no past medical history on file.    Meds:   Current Outpatient Medications on File Prior to Visit   Medication Sig Dispense Refill    aspirin 81 mg EC tablet Take 1 tablet (81 mg) by mouth once daily.      atorvastatin (Lipitor) 80 mg tablet Take 1 tablet (80 mg) by mouth once daily at bedtime.      cyanocobalamin (Vitamin B-12) 1,000 mcg tablet Take 1 tablet (1,000 mcg) by mouth once daily.      empagliflozin (Jardiance) 25 mg Take 1 tablet (25 mg) by mouth once daily.      isosorbide mononitrate ER (Imdur) 30 mg 24 hr tablet Take 1 tablet (30 mg) by mouth once daily. Do not crush or chew. 30 tablet 0    metFORMIN (Glucophage) 500 mg tablet Take 2 tablets (1,000 mg) by mouth 2 times daily (morning and late afternoon).      metoprolol succinate XL (Toprol-XL) 25 mg 24 hr tablet Take 1 tablet (25 mg) by mouth once daily. Do not crush or chew. (Patient taking differently: Take 1 tablet (25 mg) by mouth once daily. Do not crush or chew. Takes 1.5 tablets daily) 30 tablet 0    nitroglycerin (Nitrostat) 0.4 mg SL tablet Dissolve 1 tablet under the tongue as needed. FOR CHEST PAIN. IF NO RELIEF CALL 911.      sacubitriL-valsartan (Entresto) 24-26 mg tablet Take 1 tablet by mouth 2 times a day. 60 tablet 0    torsemide (Demadex) 10 mg tablet Take 1 tablet (10 mg) by mouth once daily as needed (Take if you notice swelling or have SOB, gain more than 3lbs in one day or 5lbs in one week. Call your cardiologist's office.).      valACYclovir (Valtrex) 500 mg tablet Take 1 tablet (500 mg) by mouth 2 times a day.      Xarelto 2.5 mg tablet Take 1 tablet (2.5  mg) by mouth twice a day.       No current facility-administered medications on file prior to visit.        Allergies:   No Known Allergies    SH:    Social Drivers of Health     Tobacco Use: Medium Risk (4/3/2025)    Patient History     Smoking Tobacco Use: Former     Smokeless Tobacco Use: Never     Passive Exposure: Not on file   Alcohol Use: Not At Risk (8/27/2024)    AUDIT-C     Frequency of Alcohol Consumption: Never     Average Number of Drinks: Patient does not drink     Frequency of Binge Drinking: Never   Financial Resource Strain: Low Risk  (8/29/2024)    Overall Financial Resource Strain (CARDIA)     Difficulty of Paying Living Expenses: Not very hard   Food Insecurity: No Food Insecurity (8/28/2024)    Hunger Vital Sign     Worried About Running Out of Food in the Last Year: Never true     Ran Out of Food in the Last Year: Never true   Transportation Needs: No Transportation Needs (8/29/2024)    PRAPARE - Transportation     Lack of Transportation (Medical): No     Lack of Transportation (Non-Medical): No   Physical Activity: Sufficiently Active (6/25/2024)    Received from [facility]    Exercise Vital Sign     Days of Exercise per Week: 5 days     Minutes of Exercise per Session: 30 min   Recent Concern: Physical Activity - Inactive (5/14/2024)    Received from [facility]    Exercise Vital Sign     Days of Exercise per Week: 0 days     Minutes of Exercise per Session: 0 min   Stress: Stress Concern Present (5/14/2024)    Received from [facility]    [employer] [place] [facility] - Occupational Stress Questionnaire     Feeling of Stress : To some extent   Social Connections: Unknown (8/28/2024)    Social Connection and Isolation Panel [NHANES]     Frequency of Communication with Friends and Family: Not on file     Frequency of Social Gatherings with Friends and Family: Not on file     Attends Confucianism Services: Not on file     Active Member of Clubs or Organizations: Not on  file     Attends Club or Organization Meetings: Not on file     Marital Status:    Intimate Partner Violence: Not on file   Depression: Not at risk (1/23/2025)    PHQ-2     PHQ-2 Score: 0   Housing Stability: Low Risk  (8/29/2024)    Housing Stability Vital Sign     Unable to Pay for Housing in the Last Year: No     Number of Times Moved in the Last Year: 1     Homeless in the Last Year: No   Utilities: Not At Risk (8/28/2024)    [facility] Utilities     Threatened with loss of utilities: No   Digital Equity: Not on file   Health Literacy: Adequate Health Literacy (8/28/2024)     Health Literacy     Frequency of need for help with medical instructions: Never        FH:  No family history on file.     ROS:  All systems were reviewed and are negative except as per HPI.    Objective:  Vitals:  Vitals:    04/03/25 1135   BP: (!) 91/39   Pulse:    Resp:    Temp:         Exam:  In NAD, well appearing  Abd Soft, ND/NT  Vascular examination:  Feet are warm, pulseless. No rubor or wounds    Assessment & Plan:  Oliver Zhou is 74 y.o. male with severe PAD, but no limb threat at this time. He is high risk for surgery. We agreed to try cilostazol and reserve intervention if rest pain or wound develops. RTC 6 mo with ALEX.      I spent a total of 40 minutes on the day of the visit.         Robson Mauricio M.D.

## 2025-05-06 ENCOUNTER — TELEPHONE (OUTPATIENT)
Dept: VASCULAR SURGERY | Facility: HOSPITAL | Age: 75
End: 2025-05-06
Payer: MEDICARE

## 2025-05-06 DIAGNOSIS — I65.23 CAROTID STENOSIS, ASYMPTOMATIC, BILATERAL: ICD-10-CM

## 2025-05-06 NOTE — TELEPHONE ENCOUNTER
"Mr. Zhou  called  stating that he had some dental images completed. Per  the patient , it has been  recommended by his dentist that he show the images to you as he has \" blockages in his neck arteries \"  The patient's  last   carotid ultrasound  done  1/16/2025. Following review with Dr. Mauricio I have added a carotid ultrasound to his October images being done on 10/2/2025.   The patient has been scheduled for a carotid ultrasound and ALEX studies on 10/2/2025 @ 8:45 AM. PATRICIA Alcala   "

## 2025-06-30 ENCOUNTER — INPATIENT HOSPITAL (OUTPATIENT)
Dept: URBAN - METROPOLITAN AREA HOSPITAL 50 | Facility: HOSPITAL | Age: 75
End: 2025-06-30
Payer: MEDICARE

## 2025-06-30 DIAGNOSIS — D50.0 IRON DEFICIENCY ANEMIA SECONDARY TO BLOOD LOSS (CHRONIC): ICD-10-CM

## 2025-06-30 PROCEDURE — 99222 1ST HOSP IP/OBS MODERATE 55: CPT | Performed by: INTERNAL MEDICINE

## (undated) DEVICE — SHEATH, BRITE TIP 6 X 35CM

## (undated) DEVICE — SHEATH, PINNACLE, 10 CM,  6FR INTRODUCER, 6FR DIA, 2.5 CM DIALATOR

## (undated) DEVICE — GOWN,AURORA,NONREINFORCED,LARGE: Brand: MEDLINE

## (undated) DEVICE — SHEATH, GLIDESHEATH, SLENDER, 6FR 10CM

## (undated) DEVICE — ACCESS KIT, MINI MAK, 4 FR X 10CM, 0.018 X 40CM, NITINOL/PLATINUM, STD NEEDLE

## (undated) DEVICE — MEDTRONIC  5FR JL4.0 DIAGNOSTIC CATHETER

## (undated) DEVICE — PINNACLE INTRODUCER SHEATH: Brand: PINNACLE

## (undated) DEVICE — SHEET,DRAPE,53X77,STERILE: Brand: MEDLINE

## (undated) DEVICE — GLOVE ORANGE PI 7   MSG9070

## (undated) DEVICE — CATHETER 5FR CORDIS PIG 145DEG 110CM

## (undated) DEVICE — Device

## (undated) DEVICE — GUIDEWIRE, INQUIRE, J TIP, .035 X 210CM, FIXED CORE, DIAGNOSTIC

## (undated) DEVICE — SHEATH, INTRODUCER, ACT, 6.5FR 11CML, GREEN, 0.038 IN

## (undated) DEVICE — GLOVE SURG SZ 65 THK91MIL LTX FREE SYN POLYISOPRENE

## (undated) DEVICE — CONTAINER,SPECIMEN,OR STERILE,4OZ: Brand: MEDLINE

## (undated) DEVICE — CATHETER, ANGIO, IMPULSE, FR4, 5 FR X 100 CM

## (undated) DEVICE — PAD, ELECTRODE DEFIB PADPRO ADULT STRL W/ADAPTER

## (undated) DEVICE — KIT MFLD ISOLATN NACL CNTRST PRT TBNG SPIK W/ PRSS TRNSDUC

## (undated) DEVICE — 3M™ TEGADERM™ TRANSPARENT FILM DRESSING FRAME STYLE, 1626W, 4 IN X 4-3/4 IN (10 CM X 12 CM), 50/CT 4CT/CASE: Brand: 3M™ TEGADERM™

## (undated) DEVICE — CATHETER, ANGIO, IMPULSE, FL3.5, 5 FR X 100 CM

## (undated) DEVICE — GLOVE ORANGE PI 7 1/2   MSG9075

## (undated) DEVICE — GUIDE WIRE, 260CM, HI-TORQUE, VERSACORE, MODIFIED J

## (undated) DEVICE — INTRODUCER SHTH 0.018 IN 4 FRX70 CM 21 GAX7 CM KT MIC VSI

## (undated) DEVICE — DRAPE SURG BRACH STRL

## (undated) DEVICE — KIT MED IMAG CNTRST AGNT W/ IOPAMIDOL REUSE

## (undated) DEVICE — GUIDEWIRE VASC L260CM DIA0.035IN TIP L3MM STD EXCHG PTFE J

## (undated) DEVICE — KIT ANGIO W/ AT P65 PREM HND CTRL FOR CNTRST DEL ANGIOTOUCH

## (undated) DEVICE — GUIDEWIRE, J-TIP, SUPER STIFF, AMPLATZ, 0.035 IN X 260 CM

## (undated) DEVICE — PACK PROCEDURE SURG SURG CARDIAC CATH

## (undated) DEVICE — PROVE COVER: Brand: UNBRANDED

## (undated) DEVICE — CATHETER, ANGIO, IMPULSE, FR4, 6 FR X 100 CM

## (undated) DEVICE — MEDTRONIC JR4.0 DIAGNOSTIC CATHETER